# Patient Record
Sex: FEMALE | Race: WHITE | NOT HISPANIC OR LATINO | Employment: PART TIME | URBAN - METROPOLITAN AREA
[De-identification: names, ages, dates, MRNs, and addresses within clinical notes are randomized per-mention and may not be internally consistent; named-entity substitution may affect disease eponyms.]

---

## 2017-09-28 ENCOUNTER — OFFICE VISIT (OUTPATIENT)
Dept: NEUROLOGY | Facility: CLINIC | Age: 37
End: 2017-09-28

## 2017-09-28 VITALS
OXYGEN SATURATION: 98 % | DIASTOLIC BLOOD PRESSURE: 83 MMHG | BODY MASS INDEX: 35.88 KG/M2 | WEIGHT: 195 LBS | SYSTOLIC BLOOD PRESSURE: 128 MMHG | HEART RATE: 80 BPM | HEIGHT: 62 IN

## 2017-09-28 DIAGNOSIS — G40.909 SEIZURE DISORDER (HCC): Primary | ICD-10-CM

## 2017-09-28 DIAGNOSIS — G47.33 OBSTRUCTIVE SLEEP APNEA: ICD-10-CM

## 2017-09-28 PROCEDURE — 99213 OFFICE O/P EST LOW 20 MIN: CPT | Performed by: PSYCHIATRY & NEUROLOGY

## 2017-09-28 RX ORDER — LEVETIRACETAM 500 MG/1
500 TABLET, EXTENDED RELEASE ORAL DAILY
Qty: 30 TABLET | Refills: 11 | Status: SHIPPED | OUTPATIENT
Start: 2017-09-28 | End: 2018-08-03 | Stop reason: SDUPTHER

## 2017-09-28 RX ORDER — FOLIC ACID 1 MG/1
2000 TABLET ORAL DAILY
Qty: 60 TABLET | Refills: 11 | Status: SHIPPED | OUTPATIENT
Start: 2017-09-28 | End: 2018-08-03 | Stop reason: SDUPTHER

## 2018-08-03 DIAGNOSIS — G40.909 SEIZURE DISORDER (HCC): ICD-10-CM

## 2018-08-06 RX ORDER — LEVETIRACETAM 500 MG/1
500 TABLET, EXTENDED RELEASE ORAL DAILY
Qty: 30 TABLET | Refills: 2 | Status: SHIPPED | OUTPATIENT
Start: 2018-08-06 | End: 2018-09-20 | Stop reason: SDUPTHER

## 2018-08-06 RX ORDER — FOLIC ACID 1 MG/1
2000 TABLET ORAL DAILY
Qty: 60 TABLET | Refills: 2 | Status: SHIPPED | OUTPATIENT
Start: 2018-08-06 | End: 2018-09-20 | Stop reason: SDUPTHER

## 2018-09-20 ENCOUNTER — OFFICE VISIT (OUTPATIENT)
Dept: NEUROLOGY | Facility: CLINIC | Age: 38
End: 2018-09-20

## 2018-09-20 VITALS
HEIGHT: 62 IN | HEART RATE: 84 BPM | DIASTOLIC BLOOD PRESSURE: 86 MMHG | SYSTOLIC BLOOD PRESSURE: 118 MMHG | WEIGHT: 195 LBS | OXYGEN SATURATION: 99 % | BODY MASS INDEX: 35.88 KG/M2

## 2018-09-20 DIAGNOSIS — G40.909 SEIZURE DISORDER (HCC): ICD-10-CM

## 2018-09-20 PROCEDURE — 99212 OFFICE O/P EST SF 10 MIN: CPT | Performed by: PSYCHIATRY & NEUROLOGY

## 2018-09-20 RX ORDER — FOLIC ACID 1 MG/1
2000 TABLET ORAL DAILY
Qty: 60 TABLET | Refills: 12 | Status: SHIPPED | OUTPATIENT
Start: 2018-09-20 | End: 2019-09-20

## 2018-09-20 RX ORDER — LEVETIRACETAM 500 MG/1
500 TABLET, EXTENDED RELEASE ORAL DAILY
Qty: 30 TABLET | Refills: 12 | Status: SHIPPED | OUTPATIENT
Start: 2018-09-20 | End: 2019-10-01 | Stop reason: SDUPTHER

## 2018-09-20 NOTE — PROGRESS NOTES
Subjective:     Patient ID: Val Blomo is a 38 y.o. female.    CC:   Chief Complaint   Patient presents with   • Seizures       HPI:   History of Present Illness  The following portions of the patient's history were reviewed and updated as appropriate: allergies, current medications, past family history, past medical history, past social history, past surgical history and problem list.     Denies any problems, new concerns, no seizures, significant headaches, does not see a primary. She has had a total of two seizures. EEG in [address]16 was normal, MRI showed several small bifrontal gliotic foci, felt to be migraine related.    History reviewed. No pertinent past medical history.    History reviewed. No pertinent surgical history.    Social History     Social History   • Marital status:      Spouse name: N/A   • Number of children: N/A   • Years of education: N/A     Occupational History   • Not on file.     Social History Main Topics   • Smoking status: Never Smoker   • Smokeless tobacco: Not on file   • Alcohol use Yes      Comment: Occasional   • Drug use: Unknown   • Sexual activity: Not on file     Other Topics Concern   • Not on file     Social History Narrative   • No narrative on file       Family History   Problem Relation Age of Onset   • Alcohol abuse Mother    • Alzheimer's disease Mother    • Stroke Mother    • Seizures Mother         Epilepsy   • Hypertension Mother    • Cancer Mother         History of malignant neoplasm        Review of Systems   Constitutional: Negative for chills, fatigue, fever and unexpected weight change.   HENT: Negative for ear pain, hearing loss, nosebleeds, rhinorrhea and sore throat.    Eyes: Negative for photophobia, pain, discharge, itching and visual disturbance.   Respiratory: Negative for cough, chest tightness, shortness of breath and wheezing.    Cardiovascular: Negative for chest pain, palpitations and leg swelling.   Gastrointestinal: Negative for abdominal  pain, blood in stool, constipation, diarrhea, nausea and vomiting.   Genitourinary: Negative for dysuria, frequency, hematuria and urgency.   Musculoskeletal: Negative for arthralgias, back pain, gait problem, joint swelling, myalgias, neck pain and neck stiffness.   Skin: Negative for rash and wound.   Allergic/Immunologic: Negative for environmental allergies and food allergies.   Neurological: Negative for dizziness, tremors, seizures, syncope, speech difficulty, weakness, light-headedness, numbness and headaches.   Hematological: Negative for adenopathy. Does not bruise/bleed easily.   Psychiatric/Behavioral: Negative for agitation, confusion, decreased concentration, hallucinations, sleep disturbance and suicidal ideas. The patient is not nervous/anxious.         Objective:    Neurologic Exam     Mental Status   Oriented to person, place, and time.       Physical Exam   Constitutional: She is oriented to person, place, and time. She appears well-developed and well-nourished.   Cardiovascular: Normal rate and regular rhythm.    Pulmonary/Chest: Effort normal.   Neurological: She is alert and oriented to person, place, and time. She has normal reflexes.   Psychiatric: She has a normal mood and affect. Her behavior is normal. Thought content normal.       Assessment/Plan:       Val was seen today for seizures.    Diagnoses and all orders for this visit:    Seizure disorder (CMS/[facility])  -     levETIRAcetam XR (KEPPRA XR) 500 MG 24 hr tablet; Take 1 tablet by mouth Daily.  -     folic acid (FOLVITE) 1 MG tablet; Take 2 tablets by mouth Daily.    Recommended to see a primary or women's health for health maintenance, call for problems..         Adrián Pringle MD  9/20/2018

## 2019-09-23 DIAGNOSIS — G40.909 SEIZURE DISORDER (HCC): ICD-10-CM

## 2019-09-24 RX ORDER — FOLIC ACID 1 MG/1
2000 TABLET ORAL DAILY
Qty: 60 TABLET | Refills: 0 | OUTPATIENT
Start: 2019-09-24 | End: 2020-09-23

## 2019-10-01 ENCOUNTER — APPOINTMENT (OUTPATIENT)
Dept: LAB | Facility: HOSPITAL | Age: 39
End: 2019-10-01

## 2019-10-01 ENCOUNTER — OFFICE VISIT (OUTPATIENT)
Dept: NEUROLOGY | Facility: CLINIC | Age: 39
End: 2019-10-01

## 2019-10-01 VITALS
WEIGHT: 202 LBS | DIASTOLIC BLOOD PRESSURE: 80 MMHG | HEART RATE: 92 BPM | BODY MASS INDEX: 37.17 KG/M2 | HEIGHT: 62 IN | SYSTOLIC BLOOD PRESSURE: 130 MMHG | OXYGEN SATURATION: 98 %

## 2019-10-01 DIAGNOSIS — Z76.89 ENCOUNTER TO ESTABLISH CARE: ICD-10-CM

## 2019-10-01 DIAGNOSIS — Z51.81 ENCOUNTER FOR THERAPEUTIC DRUG LEVEL MONITORING: ICD-10-CM

## 2019-10-01 DIAGNOSIS — G40.909 SEIZURE DISORDER (HCC): Primary | ICD-10-CM

## 2019-10-01 LAB
BASOPHILS # BLD AUTO: 0.06 10*3/MM3 (ref 0–0.2)
BASOPHILS NFR BLD AUTO: 0.8 % (ref 0–1.5)
DEPRECATED RDW RBC AUTO: 43 FL (ref 37–54)
EOSINOPHIL # BLD AUTO: 0.08 10*3/MM3 (ref 0–0.4)
EOSINOPHIL NFR BLD AUTO: 1.1 % (ref 0.3–6.2)
ERYTHROCYTE [DISTWIDTH] IN BLOOD BY AUTOMATED COUNT: 12.6 % (ref 12.3–15.4)
HCT VFR BLD AUTO: 41.8 % (ref 34–46.6)
HGB BLD-MCNC: 14.1 G/DL (ref 12–15.9)
IMM GRANULOCYTES # BLD AUTO: 0.02 10*3/MM3 (ref 0–0.05)
IMM GRANULOCYTES NFR BLD AUTO: 0.3 % (ref 0–0.5)
LYMPHOCYTES # BLD AUTO: 3.11 10*3/MM3 (ref 0.7–3.1)
LYMPHOCYTES NFR BLD AUTO: 41.1 % (ref 19.6–45.3)
MCH RBC QN AUTO: 31.5 PG (ref 26.6–33)
MCHC RBC AUTO-ENTMCNC: 33.7 G/DL (ref 31.5–35.7)
MCV RBC AUTO: 93.3 FL (ref 79–97)
MONOCYTES # BLD AUTO: 0.57 10*3/MM3 (ref 0.1–0.9)
MONOCYTES NFR BLD AUTO: 7.5 % (ref 5–12)
NEUTROPHILS # BLD AUTO: 3.73 10*3/MM3 (ref 1.7–7)
NEUTROPHILS NFR BLD AUTO: 49.2 % (ref 42.7–76)
NRBC BLD AUTO-RTO: 0 /100 WBC (ref 0–0.2)
PLATELET # BLD AUTO: 194 10*3/MM3 (ref 140–450)
PMV BLD AUTO: 9.9 FL (ref 6–12)
RBC # BLD AUTO: 4.48 10*6/MM3 (ref 3.77–5.28)
WBC NRBC COR # BLD: 7.57 10*3/MM3 (ref 3.4–10.8)

## 2019-10-01 PROCEDURE — 99213 OFFICE O/P EST LOW 20 MIN: CPT | Performed by: NURSE PRACTITIONER

## 2019-10-01 PROCEDURE — 36415 COLL VENOUS BLD VENIPUNCTURE: CPT | Performed by: NURSE PRACTITIONER

## 2019-10-01 PROCEDURE — 80177 DRUG SCRN QUAN LEVETIRACETAM: CPT | Performed by: NURSE PRACTITIONER

## 2019-10-01 PROCEDURE — 80053 COMPREHEN METABOLIC PANEL: CPT | Performed by: NURSE PRACTITIONER

## 2019-10-01 PROCEDURE — 85025 COMPLETE CBC W/AUTO DIFF WBC: CPT | Performed by: NURSE PRACTITIONER

## 2019-10-01 RX ORDER — FOLIC ACID 1 MG/1
2 TABLET ORAL DAILY
Qty: 60 TABLET | Refills: 11 | Status: SHIPPED | OUTPATIENT
Start: 2019-10-01 | End: 2020-10-01 | Stop reason: SDUPTHER

## 2019-10-01 RX ORDER — LEVETIRACETAM 500 MG/1
500 TABLET, EXTENDED RELEASE ORAL DAILY
Qty: 30 TABLET | Refills: 11 | Status: SHIPPED | OUTPATIENT
Start: 2019-10-01 | End: 2020-10-01 | Stop reason: SDUPTHER

## 2019-10-01 NOTE — PROGRESS NOTES
Subjective:     Patient ID: Val Bloom is a 39 y.o. female.    CC:   Chief Complaint   Patient presents with   • Seizures       HPI:   History of Present Illness   This is a 39-year-old female who presents for one-year follow-up on 2 seizure episodes once in first grade and then the last time in 2015.  She is currently well controlled on levetiracetam  mg daily.  She is also taking folic acid 2 mg daily.  She is planning to eventually have children.  She is not on birth control.  She does not have any children at this time.  She is  and is sexually active at times.  She does not have a primary care provider and would like to establish care with one.  She had an EEG in 2016 which was normal.  She had an MRI in 2016 which showed several small bifrontal gliotic foci felt to be related to migraine.  She tells me that she does have 1-2 headaches a month that occur over her head, she has some nausea, a little bit of light sensitivity, mild to moderate pain, lasting only 2 to 2-1/2 hours and she will take an Aleve or Excedrin Migraine and this helps.  Her mother has epilepsy.  For one seizure she had when she was a child was a generalized convulsive seizure.  The second time she had a seizure was witnessed by her  and was in the night when she would not respond to him and was staring and unable to speak.  She is tolerating medication well.    The following portions of the patient's history were reviewed and updated as appropriate: allergies, current medications, past family history, past medical history, past social history, past surgical history and problem list.    Past Medical History:   Diagnosis Date   • Aspiration pneumonia (CMS/HCC) 9/15/2016   • Migraine    • Seizures (CMS/HCC)     first grade initial and then 2nd in 2015       Past Surgical History:   Procedure Laterality Date   • NO PAST SURGERIES         Social History     Socioeconomic History   • Marital status:      Spouse  name: Not on file   • Number of children: Not on file   • Years of education: Not on file   • Highest education level: Not on file   Tobacco Use   • Smoking status: Never Smoker   • Smokeless tobacco: Never Used   Substance and Sexual Activity   • Alcohol use: Yes     Comment: Occasional   • Drug use: No   • Sexual activity: Yes       Family History   Problem Relation Age of Onset   • Alcohol abuse Mother    • Alzheimer's disease Mother    • Stroke Mother    • Seizures Mother         Epilepsy   • Hypertension Mother    • Cancer Mother         History of malignant neoplasm        Review of Systems   Constitutional: Negative for chills, fatigue, fever and unexpected weight change.   HENT: Negative for ear pain, hearing loss, nosebleeds, rhinorrhea and sore throat.    Eyes: Negative for photophobia, pain, discharge, itching and visual disturbance.   Respiratory: Negative for cough, chest tightness, shortness of breath and wheezing.    Cardiovascular: Negative for chest pain, palpitations and leg swelling.   Gastrointestinal: Negative for abdominal pain, blood in stool, constipation, diarrhea, nausea and vomiting.   Genitourinary: Negative for dysuria, frequency, hematuria and urgency.   Musculoskeletal: Negative for arthralgias, back pain, gait problem, joint swelling, myalgias, neck pain and neck stiffness.   Skin: Negative for rash and wound.   Allergic/Immunologic: Negative for environmental allergies and food allergies.   Neurological: Positive for seizures. Negative for dizziness, tremors, syncope, speech difficulty, weakness, light-headedness, numbness and headaches.   Hematological: Negative for adenopathy. Does not bruise/bleed easily.   Psychiatric/Behavioral: Negative for agitation, confusion, decreased concentration, hallucinations, sleep disturbance and suicidal ideas. The patient is not nervous/anxious.    All other systems reviewed and are negative.       Objective:    Neurologic Exam     Mental Status    Oriented to person, place, and time.   Level of consciousness: alert    Cranial Nerves   Cranial nerves II through XII intact.     Motor Exam   Muscle bulk: normal  Overall muscle tone: normal    Strength   Strength 5/5 throughout.     Gait, Coordination, and Reflexes     Gait  Gait: normal    Coordination   Finger to nose coordination: normal    Tremor   Resting tremor: absent  Intention tremor: absent  Action tremor: absent    Reflexes   Right brachioradialis: 2+  Left brachioradialis: 2+  Right biceps: 2+  Left biceps: 2+  Right triceps: 2+  Left triceps: 2+  Right patellar: 2+  Left patellar: 2+  Right achilles: 2+  Left achilles: 2+  Right : 2+  Left : 2+      Physical Exam   Constitutional: She is oriented to person, place, and time.   Neurological: She is oriented to person, place, and time. She has normal strength. She has a normal Finger-Nose-Finger Test. Gait normal.   Reflex Scores:       Tricep reflexes are 2+ on the right side and 2+ on the left side.       Bicep reflexes are 2+ on the right side and 2+ on the left side.       Brachioradialis reflexes are 2+ on the right side and 2+ on the left side.       Patellar reflexes are 2+ on the right side and 2+ on the left side.       Achilles reflexes are 2+ on the right side and 2+ on the left side.      Assessment/Plan:       Val was seen today for seizures.    Diagnoses and all orders for this visit:    Seizure disorder (CMS/[facility])  -     folic acid (FOLVITE) 1 MG tablet; Take 2 tablets by mouth Daily.  -     levETIRAcetam XR (KEPPRA XR) 500 MG 24 hr tablet; Take 1 tablet by mouth Daily.  -     Comprehensive Metabolic Panel; Future  -     CBC & Differential; Future  -     Levetiracetam Level (Keppra); Future  -     Comprehensive Metabolic Panel  -     CBC & Differential  -     Levetiracetam Level (Keppra)  -     CBC Auto Differential    Encounter for therapeutic drug level monitoring  -     Comprehensive Metabolic Panel; Future  -     CBC &  Differential; Future  -     Levetiracetam Level (Keppra); Future  -     Cancel: Ambulatory Referral to Family Practice  -     Comprehensive Metabolic Panel  -     CBC & Differential  -     Levetiracetam Level (Keppra)  -     CBC Auto Differential    Encounter to establish care  -     Ambulatory Referral to Family Practice         There is a note about sleep apnea history but she tells me that was actually her father.  She tells me that nobody complains that she snores.  She feels like she sleeps well some nights and not well other nights.  She does not want to have a sleep study at this time.  She may consider in the future. Reviewed medications, potential side effects and signs and symptoms to report. Discussed risk versus benefits of treatment plan with patient and/or family-including medications, labs and radiology that may be ordered. Addressed questions and concerns during visit. Patient and/or family verbalized understanding and agree with plan. F/U 1 year with Dr. Pringle. If she becomes pregnant she is to notify us, register for the national AED pregnancy registry and establish care with OB.    Patient instructions include: No driving or operating heavy machinery, solo bathing/tub baths for 3 months from onset of most recent seizure. Minimize stress as much as possible. Recommended 7-8 hours of sleep each night. Abstain from alcohol intake. Educated on Antiepileptic medications with possible side effects and signs and symptoms to report if prescribed during visit. Instructed to take seizure medication daily if prescribed. Reviewed potential seizure risk factors. Instructed to call 911 or our office if another seizure does occur.    During this visit the following were done:  Labs Reviewed []    Labs Ordered [x]    Radiology Reports Reviewed [x]    Radiology Ordered []    PCP Records Reviewed []    Referring Provider Records Reviewed []    ER Records Reviewed []    Hospital Records Reviewed []    History  Obtained From Family []    Radiology Images Reviewed []    Other Reviewed []    Records Requested []      EMR Dragon/Transcription Disclaimer:  Much of this encounter note is an electronic transcription of spoken language to printed text. Electronic transcription of spoken language may permit erroneous words or phrases to be inadvertently transcribed. Although I have reviewed the note for such errors, some may still exist in this documentation.      Yara Jensen, APRN  10/1/2019

## 2019-10-02 LAB
ALBUMIN SERPL-MCNC: 4.6 G/DL (ref 3.5–5.2)
ALBUMIN/GLOB SERPL: 1.7 G/DL
ALP SERPL-CCNC: 61 U/L (ref 39–117)
ALT SERPL W P-5'-P-CCNC: 28 U/L (ref 1–33)
ANION GAP SERPL CALCULATED.3IONS-SCNC: 13 MMOL/L (ref 5–15)
AST SERPL-CCNC: 23 U/L (ref 1–32)
BILIRUB SERPL-MCNC: 0.3 MG/DL (ref 0.2–1.2)
BUN BLD-MCNC: 15 MG/DL (ref 6–20)
BUN/CREAT SERPL: 19.2 (ref 7–25)
CALCIUM SPEC-SCNC: 9.2 MG/DL (ref 8.6–10.5)
CHLORIDE SERPL-SCNC: 103 MMOL/L (ref 98–107)
CO2 SERPL-SCNC: 23 MMOL/L (ref 22–29)
CREAT BLD-MCNC: 0.78 MG/DL (ref 0.57–1)
GFR SERPL CREATININE-BSD FRML MDRD: 83 ML/MIN/1.73
GLOBULIN UR ELPH-MCNC: 2.7 GM/DL
GLUCOSE BLD-MCNC: 87 MG/DL (ref 65–99)
POTASSIUM BLD-SCNC: 4.6 MMOL/L (ref 3.5–5.2)
PROT SERPL-MCNC: 7.3 G/DL (ref 6–8.5)
SODIUM BLD-SCNC: 139 MMOL/L (ref 136–145)

## 2019-10-02 NOTE — PROGRESS NOTES
Blood work looks normal so far. Mail patient a copy. Hopefully she will have a PCP soon-I placed [facility] pcp referral yesterday. Thanks, JIMENEZ Hernandez

## 2019-10-03 ENCOUNTER — TELEPHONE (OUTPATIENT)
Dept: NEUROLOGY | Facility: CLINIC | Age: 39
End: 2019-10-03

## 2019-10-03 NOTE — TELEPHONE ENCOUNTER
----- Message from JIMENEZ Morales sent at 10/2/2019 10:34 AM EDT -----  Blood work looks normal so far. Mail patient a copy. Hopefully she will have a PCP soon-I placed [facility] pcp referral yesterday. Thanks, JIMENEZ Hernandez

## 2019-10-04 ENCOUNTER — TELEPHONE (OUTPATIENT)
Dept: NEUROLOGY | Facility: CLINIC | Age: 39
End: 2019-10-04

## 2019-10-04 LAB — LEVETIRACETAM SERPL-MCNC: 6.4 UG/ML (ref 10–40)

## 2019-10-04 NOTE — TELEPHONE ENCOUNTER
----- Message from JIMENEZ Morales sent at 10/2/2019 10:34 AM EDT -----  Blood work looks normal so far. Mail patient a copy. Hopefully she will have a PCP soon-I placed [facility] pcp referral yesterday. Thanks, JIMENEZ Hernandez

## 2019-10-07 ENCOUNTER — TELEPHONE (OUTPATIENT)
Dept: NEUROLOGY | Facility: CLINIC | Age: 39
End: 2019-10-07

## 2019-10-07 NOTE — TELEPHONE ENCOUNTER
----- Message from JIMENEZ Morales sent at 10/2/2019 10:34 AM EDT -----  Blood work looks normal so far. Mail patient a copy. Hopefully she will have a PCP soon-I placed [facility] p[facility] referral yesterday. Thanks, JIMENEZ Hernandez

## 2019-10-14 ENCOUNTER — OFFICE VISIT (OUTPATIENT)
Dept: INTERNAL MEDICINE | Facility: CLINIC | Age: 39
End: 2019-10-14

## 2019-10-14 VITALS
HEIGHT: 62 IN | DIASTOLIC BLOOD PRESSURE: 80 MMHG | SYSTOLIC BLOOD PRESSURE: 130 MMHG | TEMPERATURE: 97.2 F | BODY MASS INDEX: 36.99 KG/M2 | OXYGEN SATURATION: 99 % | WEIGHT: 201 LBS | RESPIRATION RATE: 20 BRPM | HEART RATE: 102 BPM

## 2019-10-14 DIAGNOSIS — M54.42 CHRONIC LEFT-SIDED LOW BACK PAIN WITH LEFT-SIDED SCIATICA: Primary | ICD-10-CM

## 2019-10-14 DIAGNOSIS — Z13.21 ENCOUNTER FOR VITAMIN DEFICIENCY SCREENING: ICD-10-CM

## 2019-10-14 DIAGNOSIS — Z13.0 SCREENING FOR BLOOD DISEASE: ICD-10-CM

## 2019-10-14 DIAGNOSIS — R56.9 SEIZURES (HCC): ICD-10-CM

## 2019-10-14 DIAGNOSIS — G89.29 CHRONIC LEFT-SIDED LOW BACK PAIN WITH LEFT-SIDED SCIATICA: Primary | ICD-10-CM

## 2019-10-14 DIAGNOSIS — Z13.29 THYROID DISORDER SCREENING: ICD-10-CM

## 2019-10-14 DIAGNOSIS — Z13.220 LIPID SCREENING: ICD-10-CM

## 2019-10-14 PROCEDURE — 99204 OFFICE O/P NEW MOD 45 MIN: CPT | Performed by: NURSE PRACTITIONER

## 2019-10-14 NOTE — PROGRESS NOTES
Subjective   Chief Complaint   Patient presents with   • Seizures     establish care       Val Bloom is a 39 y.o. female here today to establish care and for back pain and seizures.  She had a seizure about 4 years ago and was started on Keppra.  She has been doing well with this and has not had any breakthrough seizures.  She has some periodic migraines but feels these are manageable.  She is still following up with neurology about twice yearly for Keppra refills.  Neurology suggested that she get a PCP due to needing fasting labs and follow-up on back pain.  She experiences low back pain with left-sided sciatica that radiates into her left leg.  Pain comes and goes but is irritated by increased physical activity.  She is able to take some anti-inflammatories and calm this down.  She does not want to get an x-ray at this time.  She denies any shortness of air or chest pain.    Review of Systems   Constitutional: Positive for fatigue. Negative for activity change, appetite change, chills, diaphoresis, fever, unexpected weight gain and unexpected weight loss.   HENT: Negative for ear discharge, ear pain, mouth sores, nosebleeds, sinus pressure, sneezing and sore throat.    Eyes: Negative for pain, discharge and itching.   Respiratory: Negative for cough, chest tightness, shortness of breath and wheezing.    Cardiovascular: Negative for chest pain, palpitations and leg swelling.   Gastrointestinal: Negative for abdominal pain, constipation, diarrhea, nausea and vomiting.   Endocrine: Negative for heat intolerance, polydipsia and polyphagia.   Genitourinary: Negative for dysuria, flank pain, frequency, hematuria and urgency.   Musculoskeletal: Positive for back pain and myalgias. Negative for gait problem, joint swelling, neck pain and neck stiffness.   Skin: Negative for color change, pallor and rash.   Allergic/Immunologic: Negative for immunocompromised state.   Neurological: Positive for seizures. Negative  "for speech difficulty, weakness and numbness.   Hematological: Negative for adenopathy.   Psychiatric/Behavioral: Negative for agitation, decreased concentration, sleep disturbance and depressed mood. The patient is not nervous/anxious.        Past Medical History:   Diagnosis Date   • Aspiration pneumonia (CMS/HCC) 9/15/2016   • Migraine    • Seizures (CMS/HCC)     first grade initial and then 2nd in 2015     Past Surgical History:   Procedure Laterality Date   • NO PAST SURGERIES       Family History   Problem Relation Age of Onset   • Alcohol abuse Mother    • Alzheimer's disease Mother    • Stroke Mother    • Seizures Mother         Epilepsy   • Hypertension Mother    • Cancer Mother         History of malignant neoplasm   • Migraines Mother      Social History     Tobacco Use   Smoking Status Never Smoker   Smokeless Tobacco Never Used      Social History     Substance and Sexual Activity   Alcohol Use Yes    Comment: Occasional      Current Outpatient Medications on File Prior to Visit   Medication Sig   • folic acid (FOLVITE) 1 MG tablet Take 2 tablets by mouth Daily.   • levETIRAcetam XR (KEPPRA XR) 500 MG 24 hr tablet Take 1 tablet by mouth Daily.     No current facility-administered medications on file prior to visit.      No Known Allergies    Objective   Vitals:    10/14/19 1502   BP: 130/80   BP Location: Left arm   Patient Position: Sitting   Cuff Size: Adult   Pulse: 102   Resp: 20   Temp: 97.2 °F (36.2 °C)   TempSrc: Temporal   SpO2: 99%   Weight: 91.2 kg (201 lb)   Height: 157.5 cm (62\")   PainSc: 0-No pain     Body mass index is 36.76 kg/m².    Physical Exam   Constitutional: She is oriented to person, place, and time. She appears well-developed and well-nourished.   HENT:   Head: Normocephalic and atraumatic.   Nose: Nose normal.   Eyes: EOM are normal. Pupils are equal, round, and reactive to light.   Neck: Trachea normal and normal range of motion. No thyromegaly present.   Cardiovascular: " Normal rate, regular rhythm and normal heart sounds.   Pulmonary/Chest: Effort normal and breath sounds normal.   Abdominal: Soft. Bowel sounds are normal. There is no tenderness.   Musculoskeletal: Normal range of motion.        Lumbar back: She exhibits tenderness.   Neurological: She is alert and oriented to person, place, and time. She has normal strength. GCS eye subscore is 4. GCS verbal subscore is 5. GCS motor subscore is 6.   Skin: Skin is warm and dry.   Psychiatric: She has a normal mood and affect. Her speech is normal and behavior is normal. Thought content normal. Cognition and memory are normal.   Vitals reviewed.      Assessment/Plan   Val was seen today for seizures.    Diagnoses and all orders for this visit:    Chronic left-sided low back pain with left-sided sciatica  Chronic issue unstable requiring medication management and monitoring. Will eat well balanced diet, increase water intake, increase physical activity as tolerated, and get adequate rest. Can use heating pad for discomfort in this area. Discussed stretching exercises and sleeping positions to help with pain.   Continue OTC anti-inflammatories PRN. Suggested xray and PT.   -     CBC Auto Differential; Future  -     Comprehensive Metabolic Panel; Future  -     Vitamin B12 & Folate; Future    Seizures (CMS/HCC)  Chronic issue stable requiring continued medication management. Will continue heart healthy diet, increase water intake, increase physical activity as tolerated, and get adequate rest.   Continue Keppra and neurology follow ups.  -     CBC Auto Differential; Future  -     Comprehensive Metabolic Panel; Future  -     Vitamin B12 & Folate; Future  -     TSH Rfx On Abnormal To Free T4; Future    Screening for blood disease  -     CBC Auto Differential; Future  -     Comprehensive Metabolic Panel; Future  -     Lipid Panel; Future  -     Vitamin B12 & Folate; Future  -     Vitamin D 25 Hydroxy; Future  -     TSH Rfx On Abnormal To  Free T4; Future    Encounter for vitamin deficiency screening  -     Vitamin B12 & Folate; Future  -     Vitamin D 25 Hydroxy; Future    Lipid screening  -     Lipid Panel; Future    Thyroid disorder screening  -     TSH Rfx On Abnormal To Free T4; Future           Current Outpatient Medications:   •  folic acid (FOLVITE) 1 MG tablet, Take 2 tablets by mouth Daily., Disp: 60 tablet, Rfl: 11  •  levETIRAcetam XR (KEPPRA XR) 500 MG 24 hr tablet, Take 1 tablet by mouth Daily., Disp: 30 tablet, Rfl: 11       Plan of care reviewed with the patient at the conclusion of today's visit.  Education was provided regarding diagnosis, management, and any prescribed or recommended OTC medications.  Patient verbalized understanding of and agreement with management plan.     Return if symptoms worsen or fail to improve.      Sarah Lema, APRN

## 2019-10-17 PROBLEM — G89.29 CHRONIC LEFT-SIDED LOW BACK PAIN WITH LEFT-SIDED SCIATICA: Status: ACTIVE | Noted: 2019-10-17

## 2019-10-17 PROBLEM — M54.42 CHRONIC LEFT-SIDED LOW BACK PAIN WITH LEFT-SIDED SCIATICA: Status: ACTIVE | Noted: 2019-10-17

## 2019-10-22 ENCOUNTER — LAB (OUTPATIENT)
Dept: INTERNAL MEDICINE | Facility: CLINIC | Age: 39
End: 2019-10-22

## 2019-10-22 DIAGNOSIS — Z13.220 LIPID SCREENING: ICD-10-CM

## 2019-10-22 DIAGNOSIS — R56.9 SEIZURES (HCC): ICD-10-CM

## 2019-10-22 DIAGNOSIS — G89.29 CHRONIC LEFT-SIDED LOW BACK PAIN WITH LEFT-SIDED SCIATICA: ICD-10-CM

## 2019-10-22 DIAGNOSIS — M54.42 CHRONIC LEFT-SIDED LOW BACK PAIN WITH LEFT-SIDED SCIATICA: ICD-10-CM

## 2019-10-22 DIAGNOSIS — Z13.21 ENCOUNTER FOR VITAMIN DEFICIENCY SCREENING: ICD-10-CM

## 2019-10-22 DIAGNOSIS — Z13.0 SCREENING FOR BLOOD DISEASE: ICD-10-CM

## 2019-10-22 DIAGNOSIS — Z13.29 THYROID DISORDER SCREENING: ICD-10-CM

## 2019-10-22 LAB
25(OH)D3 SERPL-MCNC: 19.5 NG/ML (ref 30–100)
ALBUMIN SERPL-MCNC: 4.3 G/DL (ref 3.5–5.2)
ALBUMIN/GLOB SERPL: 1.5 G/DL
ALP SERPL-CCNC: 63 U/L (ref 39–117)
ALT SERPL W P-5'-P-CCNC: 24 U/L (ref 1–33)
ANION GAP SERPL CALCULATED.3IONS-SCNC: 12.6 MMOL/L (ref 5–15)
AST SERPL-CCNC: 22 U/L (ref 1–32)
BASOPHILS # BLD AUTO: 0.05 10*3/MM3 (ref 0–0.2)
BASOPHILS NFR BLD AUTO: 0.7 % (ref 0–1.5)
BILIRUB SERPL-MCNC: 0.4 MG/DL (ref 0.2–1.2)
BUN BLD-MCNC: 14 MG/DL (ref 6–20)
BUN/CREAT SERPL: 19.2 (ref 7–25)
CALCIUM SPEC-SCNC: 8.7 MG/DL (ref 8.6–10.5)
CHLORIDE SERPL-SCNC: 103 MMOL/L (ref 98–107)
CHOLEST SERPL-MCNC: 173 MG/DL (ref 0–200)
CO2 SERPL-SCNC: 24.4 MMOL/L (ref 22–29)
CREAT BLD-MCNC: 0.73 MG/DL (ref 0.57–1)
DEPRECATED RDW RBC AUTO: 41.6 FL (ref 37–54)
EOSINOPHIL # BLD AUTO: 0.09 10*3/MM3 (ref 0–0.4)
EOSINOPHIL NFR BLD AUTO: 1.2 % (ref 0.3–6.2)
ERYTHROCYTE [DISTWIDTH] IN BLOOD BY AUTOMATED COUNT: 12.3 % (ref 12.3–15.4)
FOLATE SERPL-MCNC: >20 NG/ML (ref 4.78–24.2)
GFR SERPL CREATININE-BSD FRML MDRD: 90 ML/MIN/1.73
GLOBULIN UR ELPH-MCNC: 2.9 GM/DL
GLUCOSE BLD-MCNC: 91 MG/DL (ref 65–99)
HCT VFR BLD AUTO: 41.6 % (ref 34–46.6)
HDLC SERPL-MCNC: 36 MG/DL (ref 40–60)
HGB BLD-MCNC: 14.1 G/DL (ref 12–15.9)
IMM GRANULOCYTES # BLD AUTO: 0.02 10*3/MM3 (ref 0–0.05)
IMM GRANULOCYTES NFR BLD AUTO: 0.3 % (ref 0–0.5)
LDLC SERPL CALC-MCNC: 109 MG/DL (ref 0–100)
LDLC/HDLC SERPL: 3.03 {RATIO}
LYMPHOCYTES # BLD AUTO: 2.11 10*3/MM3 (ref 0.7–3.1)
LYMPHOCYTES NFR BLD AUTO: 27.4 % (ref 19.6–45.3)
MCH RBC QN AUTO: 31.4 PG (ref 26.6–33)
MCHC RBC AUTO-ENTMCNC: 33.9 G/DL (ref 31.5–35.7)
MCV RBC AUTO: 92.7 FL (ref 79–97)
MONOCYTES # BLD AUTO: 0.58 10*3/MM3 (ref 0.1–0.9)
MONOCYTES NFR BLD AUTO: 7.5 % (ref 5–12)
NEUTROPHILS # BLD AUTO: 4.84 10*3/MM3 (ref 1.7–7)
NEUTROPHILS NFR BLD AUTO: 62.9 % (ref 42.7–76)
NRBC BLD AUTO-RTO: 0 /100 WBC (ref 0–0.2)
PLATELET # BLD AUTO: 194 10*3/MM3 (ref 140–450)
PMV BLD AUTO: 9.8 FL (ref 6–12)
POTASSIUM BLD-SCNC: 4.5 MMOL/L (ref 3.5–5.2)
PROT SERPL-MCNC: 7.2 G/DL (ref 6–8.5)
RBC # BLD AUTO: 4.49 10*6/MM3 (ref 3.77–5.28)
SODIUM BLD-SCNC: 140 MMOL/L (ref 136–145)
TRIGL SERPL-MCNC: 139 MG/DL (ref 0–150)
TSH SERPL DL<=0.05 MIU/L-ACNC: 1.48 UIU/ML (ref 0.27–4.2)
VIT B12 BLD-MCNC: 540 PG/ML (ref 211–946)
VLDLC SERPL-MCNC: 27.8 MG/DL (ref 5–40)
WBC NRBC COR # BLD: 7.69 10*3/MM3 (ref 3.4–10.8)

## 2019-10-22 PROCEDURE — 85025 COMPLETE CBC W/AUTO DIFF WBC: CPT | Performed by: NURSE PRACTITIONER

## 2019-10-22 PROCEDURE — 82607 VITAMIN B-12: CPT | Performed by: NURSE PRACTITIONER

## 2019-10-22 PROCEDURE — 84443 ASSAY THYROID STIM HORMONE: CPT | Performed by: NURSE PRACTITIONER

## 2019-10-22 PROCEDURE — 82306 VITAMIN D 25 HYDROXY: CPT | Performed by: NURSE PRACTITIONER

## 2019-10-22 PROCEDURE — 80061 LIPID PANEL: CPT | Performed by: NURSE PRACTITIONER

## 2019-10-22 PROCEDURE — 80053 COMPREHEN METABOLIC PANEL: CPT | Performed by: NURSE PRACTITIONER

## 2019-10-22 PROCEDURE — 82746 ASSAY OF FOLIC ACID SERUM: CPT | Performed by: NURSE PRACTITIONER

## 2019-10-28 RX ORDER — ERGOCALCIFEROL 1.25 MG/1
50000 CAPSULE ORAL WEEKLY
Qty: 4 CAPSULE | Refills: 2 | Status: SHIPPED | OUTPATIENT
Start: 2019-10-28 | End: 2020-01-10

## 2019-10-28 NOTE — PROGRESS NOTES
Please contact patient and explain that test results indicate that your vitamin D level is low.  This can contribute to fatigue, depression, and problems with your bones. I have called in a prescription for Vitamin D to take once a weekly. All other labs are normal. If back pain continues have her follow up with me.

## 2019-10-29 ENCOUNTER — TELEPHONE (OUTPATIENT)
Dept: INTERNAL MEDICINE | Facility: CLINIC | Age: 39
End: 2019-10-29

## 2019-10-29 NOTE — TELEPHONE ENCOUNTER
PT LEFT  AT 12:21PM AND IS REQUESTING A CALL BACK TO DISCUSS HER LAB RESULTS. PLEASE ADVISE. CONFIRMED CALL BACK IS HOME:[phone]

## 2020-01-10 RX ORDER — ERGOCALCIFEROL 1.25 MG/1
CAPSULE ORAL
Qty: 4 CAPSULE | Refills: 2 | Status: SHIPPED | OUTPATIENT
Start: 2020-01-10 | End: 2020-04-29

## 2020-04-29 RX ORDER — ERGOCALCIFEROL 1.25 MG/1
CAPSULE ORAL
Qty: 4 CAPSULE | Refills: 2 | Status: SHIPPED | OUTPATIENT
Start: 2020-04-29 | End: 2020-07-20

## 2020-07-20 RX ORDER — ERGOCALCIFEROL 1.25 MG/1
CAPSULE ORAL
Qty: 4 CAPSULE | Refills: 2 | Status: SHIPPED | OUTPATIENT
Start: 2020-07-20 | End: 2020-10-02

## 2020-10-01 ENCOUNTER — OFFICE VISIT (OUTPATIENT)
Dept: NEUROLOGY | Facility: CLINIC | Age: 40
End: 2020-10-01

## 2020-10-01 VITALS
BODY MASS INDEX: 37.54 KG/M2 | SYSTOLIC BLOOD PRESSURE: 100 MMHG | DIASTOLIC BLOOD PRESSURE: 80 MMHG | HEIGHT: 62 IN | TEMPERATURE: 97.1 F | WEIGHT: 204 LBS | OXYGEN SATURATION: 98 % | HEART RATE: 100 BPM

## 2020-10-01 DIAGNOSIS — G40.909 SEIZURE DISORDER (HCC): ICD-10-CM

## 2020-10-01 PROCEDURE — 99212 OFFICE O/P EST SF 10 MIN: CPT | Performed by: PSYCHIATRY & NEUROLOGY

## 2020-10-01 RX ORDER — LEVETIRACETAM 500 MG/1
500 TABLET, EXTENDED RELEASE ORAL DAILY
Qty: 30 TABLET | Refills: 25 | Status: SHIPPED | OUTPATIENT
Start: 2020-10-01 | End: 2021-10-25

## 2020-10-01 RX ORDER — FOLIC ACID 1 MG/1
2 TABLET ORAL DAILY
Qty: 60 TABLET | Refills: 25 | Status: SHIPPED | OUTPATIENT
Start: 2020-10-01 | End: 2020-12-24 | Stop reason: SDUPTHER

## 2020-10-01 NOTE — PROGRESS NOTES
Subjective:     Patient ID: Val Bloom is a 40 y.o. female.    CC:   Chief Complaint   Patient presents with   • Seizures       HPI:   History of Present Illness  The following portions of the patient's history were reviewed and updated as appropriate: allergies, current medications, past family history, past medical history, past social history, past surgical history and problem list.     Denies any seizures, new concerns, tolerating Keppra.      Past Medical History:   Diagnosis Date   • Aspiration pneumonia (CMS/HCC) 9/15/2016   • Migraine    • Seizures (CMS/HCC)     first grade initial and then 2nd in 2015       Past Surgical History:   Procedure Laterality Date   • NO PAST SURGERIES         Social History     Socioeconomic History   • Marital status:      Spouse name: Not on file   • Number of children: Not on file   • Years of education: Not on file   • Highest education level: Not on file   Tobacco Use   • Smoking status: Never Smoker   • Smokeless tobacco: Never Used   Substance and Sexual Activity   • Alcohol use: Yes     Comment: Occasional   • Drug use: No   • Sexual activity: Yes       Family History   Problem Relation Age of Onset   • Alcohol abuse Mother    • Alzheimer's disease Mother    • Stroke Mother    • Seizures Mother         Epilepsy   • Hypertension Mother    • Cancer Mother         History of malignant neoplasm   • Migraines Mother         Review of Systems   Constitutional: Negative for chills, fatigue, fever and unexpected weight change.   HENT: Negative for ear pain, hearing loss, nosebleeds, rhinorrhea and sore throat.    Eyes: Negative for photophobia, pain, discharge, itching and visual disturbance.   Respiratory: Negative for cough, chest tightness, shortness of breath and wheezing.    Cardiovascular: Negative for chest pain, palpitations and leg swelling.   Gastrointestinal: Negative for abdominal pain, blood in stool, constipation, diarrhea, nausea and vomiting.  "  Genitourinary: Negative for dysuria, frequency, hematuria and urgency.   Musculoskeletal: Negative for arthralgias, back pain, gait problem, joint swelling, myalgias, neck pain and neck stiffness.   Skin: Negative for rash and wound.   Allergic/Immunologic: Negative for environmental allergies and food allergies.   Neurological: Negative for dizziness, tremors, seizures, syncope, speech difficulty, weakness, light-headedness, numbness and headaches.   Hematological: Negative for adenopathy. Does not bruise/bleed easily.   Psychiatric/Behavioral: Negative for agitation, confusion, decreased concentration, hallucinations, sleep disturbance and suicidal ideas. The patient is not nervous/anxious.         Objective:  /80   Pulse 100   Temp 97.1 °F (36.2 °C)   Ht 157.5 cm (62\")   Wt 92.5 kg (204 lb)   SpO2 98%   BMI 37.31 kg/m²     Neurologic Exam     Mental Status   Oriented to person, place, and time.       Physical Exam  Constitutional:       Appearance: She is well-developed.   Pulmonary:      Effort: Pulmonary effort is normal.   Neurological:      Mental Status: She is alert and oriented to person, place, and time.      Deep Tendon Reflexes: Reflexes are normal and symmetric.   Psychiatric:         Behavior: Behavior normal.         Thought Content: Thought content normal.         Assessment/Plan:       Diagnoses and all orders for this visit:    1. Seizure disorder (CMS/HCC)  -     folic acid (FOLVITE) 1 MG tablet; Take 2 tablets by mouth Daily.  Dispense: 60 tablet; Refill: 25  -     levETIRAcetam XR (KEPPRA XR) 500 MG 24 hr tablet; Take 1 tablet by mouth Daily.  Dispense: 30 tablet; Refill: 25               Adrián Pringle MD  10/13/2020        "

## 2020-10-02 RX ORDER — ERGOCALCIFEROL 1.25 MG/1
CAPSULE ORAL
Qty: 12 CAPSULE | Refills: 0 | Status: SHIPPED | OUTPATIENT
Start: 2020-10-02 | End: 2021-01-04 | Stop reason: SDUPTHER

## 2020-10-05 ENCOUNTER — TELEPHONE (OUTPATIENT)
Dept: NEUROLOGY | Facility: CLINIC | Age: 40
End: 2020-10-05

## 2020-10-05 NOTE — TELEPHONE ENCOUNTER
EDIN FROM [facility] CALLED NEEDING CLARIFICATION ON THE KEPPRA XR RX SHE RECEIVED. SHE STATES ON THE REFILLS, IT STATES 25 AND SHE WANTED TO VERIFY IF THAT WAS CORRECT.      CALL BACK- [phone]

## 2020-11-19 ENCOUNTER — OFFICE VISIT (OUTPATIENT)
Dept: INTERNAL MEDICINE | Facility: CLINIC | Age: 40
End: 2020-11-19

## 2020-11-19 ENCOUNTER — LAB (OUTPATIENT)
Dept: LAB | Facility: HOSPITAL | Age: 40
End: 2020-11-19

## 2020-11-19 VITALS
HEIGHT: 62 IN | BODY MASS INDEX: 37.17 KG/M2 | RESPIRATION RATE: 16 BRPM | SYSTOLIC BLOOD PRESSURE: 112 MMHG | HEART RATE: 79 BPM | TEMPERATURE: 97.3 F | OXYGEN SATURATION: 98 % | DIASTOLIC BLOOD PRESSURE: 78 MMHG | WEIGHT: 202 LBS

## 2020-11-19 DIAGNOSIS — G40.909 SEIZURE DISORDER (HCC): ICD-10-CM

## 2020-11-19 DIAGNOSIS — B37.31 VAGINAL YEAST INFECTION: ICD-10-CM

## 2020-11-19 DIAGNOSIS — Z00.00 WELLNESS EXAMINATION: Primary | ICD-10-CM

## 2020-11-19 DIAGNOSIS — N92.6 IRREGULAR MENSTRUAL CYCLE: ICD-10-CM

## 2020-11-19 LAB
25(OH)D3 SERPL-MCNC: 32.7 NG/ML (ref 30–100)
ALBUMIN SERPL-MCNC: 4.4 G/DL (ref 3.5–5.2)
ALBUMIN/GLOB SERPL: 1.6 G/DL
ALP SERPL-CCNC: 72 U/L (ref 39–117)
ALT SERPL W P-5'-P-CCNC: 34 U/L (ref 1–33)
ANION GAP SERPL CALCULATED.3IONS-SCNC: 8.6 MMOL/L (ref 5–15)
AST SERPL-CCNC: 27 U/L (ref 1–32)
BILIRUB SERPL-MCNC: 0.3 MG/DL (ref 0–1.2)
BUN SERPL-MCNC: 17 MG/DL (ref 6–20)
BUN/CREAT SERPL: 27.9 (ref 7–25)
CALCIUM SPEC-SCNC: 9 MG/DL (ref 8.6–10.5)
CHLORIDE SERPL-SCNC: 103 MMOL/L (ref 98–107)
CHOLEST SERPL-MCNC: 170 MG/DL (ref 0–200)
CO2 SERPL-SCNC: 25.4 MMOL/L (ref 22–29)
CREAT SERPL-MCNC: 0.61 MG/DL (ref 0.57–1)
DEPRECATED RDW RBC AUTO: 43.4 FL (ref 37–54)
ERYTHROCYTE [DISTWIDTH] IN BLOOD BY AUTOMATED COUNT: 12.4 % (ref 12.3–15.4)
FOLATE SERPL-MCNC: 19.4 NG/ML (ref 4.78–24.2)
GFR SERPL CREATININE-BSD FRML MDRD: 110 ML/MIN/1.73
GLOBULIN UR ELPH-MCNC: 2.7 GM/DL
GLUCOSE SERPL-MCNC: 92 MG/DL (ref 65–99)
HBA1C MFR BLD: 5.34 % (ref 4.8–5.6)
HCT VFR BLD AUTO: 41.1 % (ref 34–46.6)
HDLC SERPL-MCNC: 45 MG/DL (ref 40–60)
HGB BLD-MCNC: 14 G/DL (ref 12–15.9)
LDLC SERPL CALC-MCNC: 101 MG/DL (ref 0–100)
LDLC/HDLC SERPL: 2.17 {RATIO}
MCH RBC QN AUTO: 32.2 PG (ref 26.6–33)
MCHC RBC AUTO-ENTMCNC: 34.1 G/DL (ref 31.5–35.7)
MCV RBC AUTO: 94.5 FL (ref 79–97)
PLATELET # BLD AUTO: 194 10*3/MM3 (ref 140–450)
PMV BLD AUTO: 9.9 FL (ref 6–12)
POTASSIUM SERPL-SCNC: 4.4 MMOL/L (ref 3.5–5.2)
PROT SERPL-MCNC: 7.1 G/DL (ref 6–8.5)
RBC # BLD AUTO: 4.35 10*6/MM3 (ref 3.77–5.28)
SODIUM SERPL-SCNC: 137 MMOL/L (ref 136–145)
TRIGL SERPL-MCNC: 137 MG/DL (ref 0–150)
TSH SERPL DL<=0.05 MIU/L-ACNC: 1.8 UIU/ML (ref 0.27–4.2)
VIT B12 BLD-MCNC: 452 PG/ML (ref 211–946)
VLDLC SERPL-MCNC: 24 MG/DL (ref 5–40)
WBC # BLD AUTO: 8.49 10*3/MM3 (ref 3.4–10.8)

## 2020-11-19 PROCEDURE — 90686 IIV4 VACC NO PRSV 0.5 ML IM: CPT | Performed by: NURSE PRACTITIONER

## 2020-11-19 PROCEDURE — 82607 VITAMIN B-12: CPT | Performed by: NURSE PRACTITIONER

## 2020-11-19 PROCEDURE — 99395 PREV VISIT EST AGE 18-39: CPT | Performed by: NURSE PRACTITIONER

## 2020-11-19 PROCEDURE — 90471 IMMUNIZATION ADMIN: CPT | Performed by: NURSE PRACTITIONER

## 2020-11-19 PROCEDURE — 83036 HEMOGLOBIN GLYCOSYLATED A1C: CPT | Performed by: NURSE PRACTITIONER

## 2020-11-19 PROCEDURE — 80053 COMPREHEN METABOLIC PANEL: CPT | Performed by: NURSE PRACTITIONER

## 2020-11-19 PROCEDURE — 84443 ASSAY THYROID STIM HORMONE: CPT | Performed by: NURSE PRACTITIONER

## 2020-11-19 PROCEDURE — 80061 LIPID PANEL: CPT | Performed by: NURSE PRACTITIONER

## 2020-11-19 PROCEDURE — 82306 VITAMIN D 25 HYDROXY: CPT | Performed by: NURSE PRACTITIONER

## 2020-11-19 PROCEDURE — 82746 ASSAY OF FOLIC ACID SERUM: CPT | Performed by: NURSE PRACTITIONER

## 2020-11-19 PROCEDURE — 85027 COMPLETE CBC AUTOMATED: CPT | Performed by: NURSE PRACTITIONER

## 2020-11-19 RX ORDER — FLUCONAZOLE 150 MG/1
TABLET ORAL
Qty: 2 TABLET | Refills: 0 | Status: SHIPPED | OUTPATIENT
Start: 2020-11-19 | End: 2021-03-23

## 2020-12-24 DIAGNOSIS — G40.909 SEIZURE DISORDER (HCC): ICD-10-CM

## 2020-12-24 RX ORDER — FOLIC ACID 1 MG/1
2 TABLET ORAL DAILY
Qty: 60 TABLET | Refills: 5 | Status: SHIPPED | OUTPATIENT
Start: 2020-12-24 | End: 2021-10-25

## 2021-01-04 RX ORDER — ERGOCALCIFEROL 1.25 MG/1
50000 CAPSULE ORAL WEEKLY
Qty: 12 CAPSULE | Refills: 0 | Status: SHIPPED | OUTPATIENT
Start: 2021-01-04 | End: 2021-03-30

## 2021-03-22 PROBLEM — Z01.419 WELL WOMAN EXAM: Status: ACTIVE | Noted: 2021-03-22

## 2021-03-22 PROBLEM — N93.9 ABNORMAL UTERINE BLEEDING (AUB): Status: ACTIVE | Noted: 2021-03-22

## 2021-03-23 ENCOUNTER — LAB (OUTPATIENT)
Dept: LAB | Facility: HOSPITAL | Age: 41
End: 2021-03-23

## 2021-03-23 ENCOUNTER — OFFICE VISIT (OUTPATIENT)
Dept: OBSTETRICS AND GYNECOLOGY | Facility: CLINIC | Age: 41
End: 2021-03-23

## 2021-03-23 VITALS
HEIGHT: 62 IN | DIASTOLIC BLOOD PRESSURE: 84 MMHG | SYSTOLIC BLOOD PRESSURE: 112 MMHG | WEIGHT: 202.6 LBS | BODY MASS INDEX: 37.28 KG/M2

## 2021-03-23 DIAGNOSIS — N93.9 ABNORMAL UTERINE BLEEDING (AUB): Primary | ICD-10-CM

## 2021-03-23 DIAGNOSIS — Z12.4 SCREENING FOR MALIGNANT NEOPLASM OF CERVIX: ICD-10-CM

## 2021-03-23 DIAGNOSIS — N93.9 ABNORMAL UTERINE BLEEDING (AUB): ICD-10-CM

## 2021-03-23 LAB
B-HCG UR QL: NEGATIVE
DEPRECATED RDW RBC AUTO: 43.5 FL (ref 37–54)
ERYTHROCYTE [DISTWIDTH] IN BLOOD BY AUTOMATED COUNT: 12.7 % (ref 12.3–15.4)
HCT VFR BLD AUTO: 40.5 % (ref 34–46.6)
HGB BLD-MCNC: 14 G/DL (ref 12–15.9)
INTERNAL NEGATIVE CONTROL: NEGATIVE
INTERNAL POSITIVE CONTROL: POSITIVE
Lab: NORMAL
MCH RBC QN AUTO: 32 PG (ref 26.6–33)
MCHC RBC AUTO-ENTMCNC: 34.6 G/DL (ref 31.5–35.7)
MCV RBC AUTO: 92.7 FL (ref 79–97)
PLATELET # BLD AUTO: 214 10*3/MM3 (ref 140–450)
PMV BLD AUTO: 9.8 FL (ref 6–12)
RBC # BLD AUTO: 4.37 10*6/MM3 (ref 3.77–5.28)
WBC # BLD AUTO: 9.24 10*3/MM3 (ref 3.4–10.8)

## 2021-03-23 PROCEDURE — 85027 COMPLETE CBC AUTOMATED: CPT

## 2021-03-23 PROCEDURE — 84403 ASSAY OF TOTAL TESTOSTERONE: CPT

## 2021-03-23 PROCEDURE — 82670 ASSAY OF TOTAL ESTRADIOL: CPT

## 2021-03-23 PROCEDURE — 99203 OFFICE O/P NEW LOW 30 MIN: CPT | Performed by: OBSTETRICS & GYNECOLOGY

## 2021-03-23 PROCEDURE — 84402 ASSAY OF FREE TESTOSTERONE: CPT

## 2021-03-23 PROCEDURE — 84443 ASSAY THYROID STIM HORMONE: CPT

## 2021-03-23 PROCEDURE — 81025 URINE PREGNANCY TEST: CPT | Performed by: OBSTETRICS & GYNECOLOGY

## 2021-03-23 PROCEDURE — 83001 ASSAY OF GONADOTROPIN (FSH): CPT

## 2021-03-23 PROCEDURE — 84146 ASSAY OF PROLACTIN: CPT

## 2021-03-23 NOTE — PROGRESS NOTES
"Subjective   Chief Complaint   Patient presents with   • [facility]     New pt, referred for irregular periods     Val Bloom is a 40 y.o. year old .  Patient's last menstrual period was 2021 (exact date).  She presents to be seen because of issues with menstrual cycles.  Patient reports since November her cycles has been lasting for longer duration and she has skipped 1 or 2 months.  Ports prior to this her cycles were monthly with duration about 3 to 4 days.  Duration recently since November has been anywhere from 6 to 11 days.  She is currently on her cycle just started today.  She reports the flow has been about the same with no increase in heaviness.  She reports she is not currently sexually active.  She does report some recent weight gain in the past 6 to 12 months.  She reports she is never used any type of contraception in the past.    OTHER THINGS SHE WANTS TO DISCUSS TODAY:  Nothing else    The following portions of the patient's history were reviewed and updated as appropriate:current medications, allergies, past family history, past medical history, past social history and past surgical history    Social History    Tobacco Use      Smoking status: Never Smoker      Smokeless tobacco: Never Used    Review of Systems  Constitutional POS: nothing reported    NEG: anorexia or night sweats   Genitourinary POS: nothing reported    NEG: dysuria or hematuria   Gastointestinal POS: nothing reported    NEG: bloating, change in bowel habits, melena or reflux symptoms   Integument POS: nothing reported    NEG: moles that are changing in size, shape, color or rashes   Breast POS: nothing reported    NEG: persistent breast lump, skin dimpling or nipple discharge         Objective   /84   Ht 157.5 cm (62\")   Wt 91.9 kg (202 lb 9.6 oz)   LMP 2021 (Exact Date)   Breastfeeding No   BMI 37.06 kg/m²     General:  well developed; well nourished  no acute distress   Skin:  Not " performed.   Thyroid: normal to inspection and palpation   Lungs:  breathing is unlabored   Heart:  Not performed.   Breasts:  Not performed.   Abdomen: soft, non-tender; no masses  no umbilical or inguinal hernias are present  no hepato-splenomegaly   Pelvis: Clinical staff was present for exam  External genitalia:  normal appearance of the external genitalia including Bartholin's and Lorimor's glands.  :  urethral meatus normal;  Vaginal:  normal pink mucosa without prolapse or lesions.  Cervix:  normal appearance.  Uterus:  normal size, shape and consistency.  Adnexa:  normal bimanual exam of the adnexa.   Patient tolerability of the exam limited and had to use yellow speculum.     Lab Review   No data reviewed    Imaging   No data reviewed        Assessment   1. Abnormal uterine bleeding that needs further work-up.  Suspect there could be an anovulatory cycle component given recent weight gain  2. Cervical cancer screening      Plan   1. Recommend that we proceed with pelvic ultrasound and visit to further assess  Pap and HPV were done today.  If she does not receive the results of the Pap within 2 weeks  time, she was instructed to call to find out the results.  I explained to Val that the recommendations for Pap smear interval in a low risk patient's has lengthened to 5 years time if both cytology and HPV testing were normal.  I encouraged her to be seen yearly for a full physical exam including breast and pelvic exam even during the off years when PAP's will not be performed.  The following tests were ordered today: see below listed labs.  It was explained to Val that all lab test should be back within the one week after they are performed. She will be notified about the results, regardless of the findings. If she has not been contacted by the office within 2 weeks after the test has been performed, it is her responsibility to contact us to learn about her results.  The importance of keeping all planned  follow-up and taking all medications as prescribed was emphasized.  Follow up with ultrasound and visit.  Patient potentially may need an endometrial biopsy but I am unsure whether she would tolerate this in the office.    No orders of the defined types were placed in this encounter.       Orders Placed This Encounter   Procedures   • US Non-ob Transvaginal     Standing Status:   Future     Standing Expiration Date:   3/23/2022     Order Specific Question:   Reason for Exam:     Answer:   irregular menses   • TSH Rfx On Abnormal To Free T4     Standing Status:   Future     Number of Occurrences:   1     Standing Expiration Date:   3/23/2022   • Follicle Stimulating Hormone     Standing Status:   Future     Number of Occurrences:   1     Standing Expiration Date:   3/23/2022   • Estradiol     Standing Status:   Future     Number of Occurrences:   1     Standing Expiration Date:   3/23/2022   • CBC (No Diff)     Standing Status:   Future     Number of Occurrences:   1     Standing Expiration Date:   3/23/2022   • Prolactin     Standing Status:   Future     Number of Occurrences:   1     Standing Expiration Date:   3/23/2022   • Testosterone - total     Standing Status:   Future     Number of Occurrences:   1     Standing Expiration Date:   3/23/2022   • Testosterone Free Direct     Standing Status:   Future     Number of Occurrences:   1     Standing Expiration Date:   3/23/2022   • POC Pregnancy, Urine       This note was electronically signed.    Latonia Gracia MD  March 23, 2021    Note: Speech recognition transcription software may have been used to create portions of this document.  An attempt at proofreading has been made but errors in transcription could still be present.

## 2021-03-24 LAB
ESTRADIOL SERPL HS-MCNC: 57.8 PG/ML
FSH SERPL-ACNC: 8.59 MIU/ML
PROLACTIN SERPL-MCNC: 12.1 NG/ML (ref 4.79–23.3)
TESTOST SERPL-MCNC: 28.6 NG/DL (ref 8.4–48.1)
TSH SERPL DL<=0.05 MIU/L-ACNC: 1.29 UIU/ML (ref 0.27–4.2)

## 2021-03-27 LAB — TESTOST FREE SERPL-MCNC: 2.9 PG/ML (ref 0–4.2)

## 2021-03-30 RX ORDER — ERGOCALCIFEROL 1.25 MG/1
CAPSULE ORAL
Qty: 4 CAPSULE | Refills: 1 | Status: SHIPPED | OUTPATIENT
Start: 2021-03-30 | End: 2021-05-24

## 2021-04-27 ENCOUNTER — TELEPHONE (OUTPATIENT)
Dept: OBSTETRICS AND GYNECOLOGY | Facility: CLINIC | Age: 41
End: 2021-04-27

## 2021-05-24 RX ORDER — ERGOCALCIFEROL 1.25 MG/1
CAPSULE ORAL
Qty: 4 CAPSULE | Refills: 1 | Status: SHIPPED | OUTPATIENT
Start: 2021-05-24 | End: 2021-07-19

## 2021-07-19 RX ORDER — ERGOCALCIFEROL 1.25 MG/1
CAPSULE ORAL
Qty: 4 CAPSULE | Refills: 1 | Status: SHIPPED | OUTPATIENT
Start: 2021-07-19 | End: 2022-01-13

## 2021-08-17 RX ORDER — ERGOCALCIFEROL 1.25 MG/1
CAPSULE ORAL
Qty: 4 CAPSULE | Refills: 1 | OUTPATIENT
Start: 2021-08-17

## 2021-08-20 ENCOUNTER — TELEPHONE (OUTPATIENT)
Dept: NEUROLOGY | Facility: CLINIC | Age: 41
End: 2021-08-20

## 2021-10-24 DIAGNOSIS — G40.909 SEIZURE DISORDER (HCC): ICD-10-CM

## 2021-10-25 RX ORDER — FOLIC ACID 1 MG/1
2 TABLET ORAL DAILY
Qty: 180 TABLET | Refills: 1 | Status: SHIPPED | OUTPATIENT
Start: 2021-10-25 | End: 2022-01-13

## 2021-10-25 RX ORDER — LEVETIRACETAM 500 MG/1
500 TABLET, EXTENDED RELEASE ORAL DAILY
Qty: 90 TABLET | Refills: 1 | Status: SHIPPED | OUTPATIENT
Start: 2021-10-25 | End: 2022-01-13

## 2021-10-25 NOTE — TELEPHONE ENCOUNTER
Please schedule follow up IN OFFICE with first available provider. This is a prior Dr. Pringle patient. I will refill her medicines through February 2022 due to long wait for appointment. Thanks, JIMENEZ Alejandre

## 2021-10-25 NOTE — TELEPHONE ENCOUNTER
Rx Refill Note  Requested Prescriptions     Pending Prescriptions Disp Refills   • folic acid (FOLVITE) 1 MG tablet [Pharmacy Med Name: FOLIC ACID 1MG TABLETS] 60 tablet 5     Sig: TAKE 2 TABLETS BY MOUTH DAILY   • levETIRAcetam XR (KEPPRA XR) 500 MG 24 hr tablet [Pharmacy Med Name: LEVETIRACETAM ER 500MG TABLETS] 30 tablet 25     Sig: TAKE 1 TABLET BY MOUTH DAILY      Last office visit with prescribing clinician: 10/1/2020      Next office visit with prescribing clinician: Halie F/U scheduled           Rachele Camarillo CMA  10/25/21, 08:15 EDT

## 2022-01-13 ENCOUNTER — HOSPITAL ENCOUNTER (EMERGENCY)
Facility: HOSPITAL | Age: 42
Discharge: HOME | End: 2022-01-13
Attending: EMERGENCY MEDICINE | Admitting: EMERGENCY MEDICINE

## 2022-01-13 ENCOUNTER — APPOINTMENT (OUTPATIENT)
Dept: GENERAL RADIOLOGY | Facility: HOSPITAL | Age: 42
End: 2022-01-13

## 2022-01-13 ENCOUNTER — OFFICE VISIT (OUTPATIENT)
Dept: NEUROLOGY | Facility: CLINIC | Age: 42
End: 2022-01-13

## 2022-01-13 VITALS
DIASTOLIC BLOOD PRESSURE: 70 MMHG | OXYGEN SATURATION: 98 % | BODY MASS INDEX: 35.97 KG/M2 | HEIGHT: 63 IN | SYSTOLIC BLOOD PRESSURE: 122 MMHG | HEART RATE: 94 BPM | WEIGHT: 203 LBS

## 2022-01-13 VITALS
DIASTOLIC BLOOD PRESSURE: 98 MMHG | HEART RATE: 100 BPM | HEIGHT: 63 IN | BODY MASS INDEX: 36.32 KG/M2 | TEMPERATURE: 97.9 F | OXYGEN SATURATION: 98 % | SYSTOLIC BLOOD PRESSURE: 144 MMHG | RESPIRATION RATE: 20 BRPM | WEIGHT: 205 LBS

## 2022-01-13 DIAGNOSIS — S51.812A LACERATION OF LEFT FOREARM, INITIAL ENCOUNTER: Primary | ICD-10-CM

## 2022-01-13 DIAGNOSIS — G43.009 MIGRAINE WITHOUT AURA AND WITHOUT STATUS MIGRAINOSUS, NOT INTRACTABLE: ICD-10-CM

## 2022-01-13 DIAGNOSIS — G40.909 SEIZURE DISORDER: Primary | ICD-10-CM

## 2022-01-13 PROCEDURE — 99283 EMERGENCY DEPT VISIT LOW MDM: CPT

## 2022-01-13 PROCEDURE — 73090 X-RAY EXAM OF FOREARM: CPT

## 2022-01-13 PROCEDURE — 90471 IMMUNIZATION ADMIN: CPT | Performed by: PHYSICIAN ASSISTANT

## 2022-01-13 PROCEDURE — 99213 OFFICE O/P EST LOW 20 MIN: CPT | Performed by: NURSE PRACTITIONER

## 2022-01-13 PROCEDURE — 25010000002 TETANUS-DIPHTH-ACELL PERTUSSIS 5-2.5-18.5 LF-MCG/0.5 SUSPENSION PREFILLED SYRINGE: Performed by: PHYSICIAN ASSISTANT

## 2022-01-13 PROCEDURE — 90715 TDAP VACCINE 7 YRS/> IM: CPT | Performed by: PHYSICIAN ASSISTANT

## 2022-01-13 PROCEDURE — 0 LIDOCAINE 1 % SOLUTION: Performed by: PHYSICIAN ASSISTANT

## 2022-01-13 RX ORDER — FOLIC ACID 1 MG/1
2 TABLET ORAL DAILY
Qty: 180 TABLET | Refills: 3 | Status: SHIPPED | OUTPATIENT
Start: 2022-01-13 | End: 2023-01-13

## 2022-01-13 RX ORDER — LIDOCAINE HYDROCHLORIDE 10 MG/ML
10 INJECTION, SOLUTION INFILTRATION; PERINEURAL ONCE
Status: COMPLETED | OUTPATIENT
Start: 2022-01-13 | End: 2022-01-13

## 2022-01-13 RX ORDER — SUMATRIPTAN 50 MG/1
TABLET, FILM COATED ORAL
Qty: 27 TABLET | Refills: 3 | Status: SHIPPED | OUTPATIENT
Start: 2022-01-13 | End: 2023-01-13

## 2022-01-13 RX ORDER — LEVETIRACETAM 500 MG/1
500 TABLET, EXTENDED RELEASE ORAL DAILY
Qty: 90 TABLET | Refills: 3 | Status: SHIPPED | OUTPATIENT
Start: 2022-01-13 | End: 2023-01-13

## 2022-01-13 RX ORDER — CEPHALEXIN 500 MG/1
500 CAPSULE ORAL 3 TIMES DAILY
Qty: 15 CAPSULE | Refills: 0 | Status: SHIPPED | OUTPATIENT
Start: 2022-01-13 | End: 2022-01-18

## 2022-01-13 RX ORDER — CEPHALEXIN 250 MG/1
500 CAPSULE ORAL ONCE
Status: COMPLETED | OUTPATIENT
Start: 2022-01-13 | End: 2022-01-13

## 2022-01-13 RX ADMIN — TETANUS TOXOID, REDUCED DIPHTHERIA TOXOID AND ACELLULAR PERTUSSIS VACCINE, ADSORBED 0.5 ML: 5; 2.5; 8; 8; 2.5 SUSPENSION INTRAMUSCULAR at 21:20

## 2022-01-13 RX ADMIN — CEPHALEXIN 500 MG: 250 CAPSULE ORAL at 22:46

## 2022-01-13 RX ADMIN — LIDOCAINE HYDROCHLORIDE 10 ML: 10 INJECTION, SOLUTION INFILTRATION; PERINEURAL at 21:33

## 2022-01-13 NOTE — PROGRESS NOTES
Subjective:     Patient ID: Val Bloom is a 41 y.o. female.    CC:   Chief Complaint   Patient presents with   • Seizures       HPI:   History of Present Illness   This is a 41-year-old female who presents for one-year follow-up on 2 seizure episodes once in first grade and then the last time in 2015.  She is currently well controlled on levetiracetam  mg daily.  She is also taking folic acid 2 mg daily.  She is planning to eventually have children.  She is not on birth control. She does not have any children at this time.  She is  and is sexually active at times.  She had an EEG in 2016 which was normal.  She had an MRI in 2016 which showed several small bifrontal gliotic foci felt to be related to migraine.  She tells me that she does have 1-2 headaches a month that occur over 1 side of head, she has some nausea, a little bit of light sensitivity, mild to moderate pain, lasting only 2 to 2-1/2 hours and she will take an Aleve or Excedrin Migraine and this helps. She did have a stronger migraine last week and this lasted 5.5 hours and took excedrin and went to sleep. When she woke up the headache was done. Could have been weather related and stress work together. Her mother has epilepsy & migraines. She has not tried a triptan.  For one seizure she had when she was a child was a generalized convulsive seizure.  The second time she had a seizure was witnessed by her  and was in the night when she would not respond to him and was staring and unable to speak. Previously followed long term by Dr. Pringle.  She had blood work with her primary care provider 11/19/202 CMP essentially normal, CBC without differential within normal limits, lipid panel with  otherwise normal, TSH normal, hemoglobin A1c 5.34, vitamin B12 452 and folate 19.4, and vitamin D 32.7.  She plans to call her PCP to schedule annual physical and mammogram this year.    The following portions of the patient's history  were reviewed and updated as appropriate: allergies, current medications, past family history, past medical history, past social history, past surgical history and problem list.    Past Medical History:   Diagnosis Date   • Aspiration pneumonia (HCC) 9/15/2016   • Chronic left-sided low back pain with left-sided sciatica    • Migraine without aura    • Seizures (HCC)     first grade initial and then 2nd in 2015       Past Surgical History:   Procedure Laterality Date   • NO PAST SURGERIES         Social History     Socioeconomic History   • Marital status:    Tobacco Use   • Smoking status: Never Smoker   • Smokeless tobacco: Never Used   Vaping Use   • Vaping Use: Never used   Substance and Sexual Activity   • Alcohol use: Not Currently     Alcohol/week: 0.0 standard drinks     Comment: Occasional   • Drug use: No   • Sexual activity: Yes     Partners: Male     Birth control/protection: None       Family History   Problem Relation Age of Onset   • Alcohol abuse Mother    • Alzheimer's disease Mother    • Stroke Mother    • Seizures Mother         Epilepsy   • Hypertension Mother    • Cancer Mother         History of malignant neoplasm   • Migraines Mother    • Breast cancer Paternal Aunt    • Ovarian cancer Neg Hx    • Uterine cancer Neg Hx    • Colon cancer Neg Hx    • Osteoporosis Neg Hx         Review of Systems   Constitutional: Negative for chills, fatigue, fever and unexpected weight change.   HENT: Negative for ear pain, hearing loss, nosebleeds, rhinorrhea and sore throat.    Eyes: Negative for photophobia, pain, discharge, itching and visual disturbance.   Respiratory: Negative for cough, chest tightness, shortness of breath and wheezing.    Cardiovascular: Negative for chest pain, palpitations and leg swelling.   Gastrointestinal: Negative for abdominal pain, blood in stool, constipation, diarrhea, nausea and vomiting.   Genitourinary: Negative for dysuria, frequency, hematuria and urgency.  "  Musculoskeletal: Negative for arthralgias, back pain, gait problem, joint swelling, myalgias, neck pain and neck stiffness.   Skin: Negative for rash and wound.   Allergic/Immunologic: Negative for environmental allergies and food allergies.   Neurological: Positive for headaches. Negative for dizziness, tremors, seizures, syncope, speech difficulty, weakness, light-headedness and numbness.   Hematological: Negative for adenopathy. Does not bruise/bleed easily.   Psychiatric/Behavioral: Negative for agitation, confusion, decreased concentration, hallucinations, sleep disturbance and suicidal ideas. The patient is not nervous/anxious.    All other systems reviewed and are negative.       Objective:  /70   Pulse 94   Ht 160 cm (63\")   Wt 92.1 kg (203 lb)   SpO2 98%   BMI 35.96 kg/m²     Neurologic Exam     Mental Status   Oriented to person, place, and time.   Speech: speech is normal   Level of consciousness: alert    Cranial Nerves   Cranial nerves II through XII intact.     Motor Exam   Muscle bulk: normal  Overall muscle tone: normal    Strength   Strength 5/5 throughout.     Gait, Coordination, and Reflexes     Gait  Gait: normal    Coordination   Finger to nose coordination: normal    Tremor   Resting tremor: absent  Intention tremor: absent  Action tremor: absent    Reflexes   Right brachioradialis: 2+  Left brachioradialis: 2+  Right biceps: 2+  Left biceps: 2+  Right patellar: 2+  Left patellar: 2+  Right achilles: 2+  Left achilles: 2+  Right : 2+  Left : 2+      Physical Exam  Constitutional:       Appearance: Normal appearance.   Neurological:      Mental Status: She is alert and oriented to person, place, and time.      Coordination: Finger-Nose-Finger Test normal.      Gait: Gait is intact.      Deep Tendon Reflexes: Strength normal.      Reflex Scores:       Bicep reflexes are 2+ on the right side and 2+ on the left side.       Brachioradialis reflexes are 2+ on the right side and " 2+ on the left side.       Patellar reflexes are 2+ on the right side and 2+ on the left side.       Achilles reflexes are 2+ on the right side and 2+ on the left side.  Psychiatric:         Mood and Affect: Mood and affect normal.         Speech: Speech normal.         Assessment/Plan:       Diagnoses and all orders for this visit:    1. Seizure disorder (HCC) (Primary)  -     folic acid (FOLVITE) 1 MG tablet; Take 2 tablets by mouth Daily.  Dispense: 180 tablet; Refill: 3  -     levETIRAcetam XR (KEPPRA XR) 500 MG 24 hr tablet; Take 1 tablet by mouth Daily.  Dispense: 90 tablet; Refill: 3    2. Migraine without aura and without status migrainosus, not intractable  -     SUMAtriptan (Imitrex) 50 MG tablet; Take one tablet at onset of headache. May repeat dose one time in 2 hours if headache not relieved.  Dispense: 27 tablet; Refill: 3           Continue current medications.  We'll add sumatriptan as needed for migraines.  Follow-up in 1 year or sooner if needed.  Reviewed medications, potential side effects and signs and symptoms to report. Discussed risk versus benefits of treatment plan with patient and/or family-including medications, labs and radiology that may be ordered. Addressed questions and concerns during visit. Patient and/or family verbalized understanding and agree with plan.    AS THE PROVIDER, I PERSONALLY WORE PPE DURING ENTIRE FACE TO FACE ENCOUNTER IN CLINIC WITH THE PATIENT. PATIENT ALSO WORE PPE DURING ENTIRE FACE TO FACE ENCOUNTER EXCEPT FOR A MAX OF 30 SECONDS DURING NEUROLOGICAL EVALUATION OF CRANIAL NERVES AND THEN MASK WAS PLACED BACK OVER PATIENT FACE FOR REMAINDER OF VISIT. I WASHED MY HANDS BEFORE AND AFTER VISIT.    Patient instructions include: No driving or operating heavy machinery, solo bathing or tub baths for 90 days from onset of most recent seizure, if they currently hold a license. Minimize stress as much as possible. Recommended 7-8 hours of sleep each night. Abstain from  "alcohol intake. Educated on Antiepileptic medications with possible side effects and signs and symptoms to report if prescribed during visit. Instructed to take seizure medication daily if prescribed. Reviewed potential seizure risk factors. Instructed to call 911 or our office if another seizure does occur.    I also discussed SUDEP risk with the patient and/or family today. Sudden Unexpected Death in Epilepsy is defined as \"the sudden, unexpected, witnessed or unwitnessed, non-traumatic, and non-drowning death in patients with epilepsy with or without evidence for a seizure, and excluding documented status epilepticus, in which postmortem examination does not reveal a structural or toxicological cause for death.\"-Epilepsy Foundation 2021. SUDEP is most commonly seen in Epilepsy patient's who's seizures are not well controlled or who have seizures during their sleep. This condition is not fully understood. To reduce your risk of SUDEP, please take your seizure medications as prescribed, keep a diary of your seizures and when they occur and if your seizures are not well controlled, please notify us so we can collaborate with you to get your seizures better controlled. If you have additional questions, please visit: [email or web address] for more information.    During this visit the following were done:  Labs Reviewed [x]    Labs Ordered []    Radiology Reports Reviewed []    Radiology Ordered []    PCP Records Reviewed [x]    Referring Provider Records Reviewed []    ER Records Reviewed []    Hospital Records Reviewed []    History Obtained From Family []    Radiology Images Reviewed []    Other Reviewed [x]    Records Requested []      Yara Jensen, APRSENG  1/13/2022        "

## 2022-01-14 NOTE — ED PROVIDER NOTES
Subjective   41-year-old female accompanied by her  reports to the emergency room with a laceration to her left forearm post fall.  Patient reports she was carrying a wine glass and tripped over a pair of shoes causing her to fall.  The wine glass shattered and cut her arm.  She did not lose consciousness.  Patient reports her tetanus is not up to date.  She has no other complaints at this time.      History provided by:  Patient and spouse   used: No        Review of Systems   Skin: Positive for wound.        Laceration to left forearm   All other systems reviewed and are negative.      Past Medical History:   Diagnosis Date   • Aspiration pneumonia (HCC) 9/15/2016   • Chronic left-sided low back pain with left-sided sciatica    • Migraine without aura    • Seizures (HCC)     first grade initial and then 2nd in 2015       No Known Allergies    Past Surgical History:   Procedure Laterality Date   • NO PAST SURGERIES         Family History   Problem Relation Age of Onset   • Alcohol abuse Mother    • Alzheimer's disease Mother    • Stroke Mother    • Seizures Mother         Epilepsy   • Hypertension Mother    • Cancer Mother         History of malignant neoplasm   • Migraines Mother    • Breast cancer Paternal Aunt    • Ovarian cancer Neg Hx    • Uterine cancer Neg Hx    • Colon cancer Neg Hx    • Osteoporosis Neg Hx        Social History     Socioeconomic History   • Marital status:    Tobacco Use   • Smoking status: Never Smoker   • Smokeless tobacco: Never Used   Vaping Use   • Vaping Use: Never used   Substance and Sexual Activity   • Alcohol use: Not Currently     Alcohol/week: 0.0 standard drinks     Comment: Occasional   • Drug use: No   • Sexual activity: Yes     Partners: Male     Birth control/protection: None           Objective   Physical Exam  Vitals and nursing note reviewed.   Constitutional:       Appearance: Normal appearance. She is well-developed.   HENT:       Head: Normocephalic.   Cardiovascular:      Rate and Rhythm: Normal rate and regular rhythm.      Heart sounds: Normal heart sounds.   Pulmonary:      Effort: Pulmonary effort is normal.      Breath sounds: Normal breath sounds.   Musculoskeletal:         General: Normal range of motion.      Cervical back: Normal range of motion.   Skin:     General: Skin is warm and dry.             Comments: 2 cm laceration to the left forearm   Neurological:      Mental Status: She is alert and oriented to person, place, and time.      Deep Tendon Reflexes: Reflexes are normal and symmetric.   Psychiatric:         Mood and Affect: Mood normal.         Laceration Repair    Date/Time: 1/13/2022 10:28 PM  Performed by: Mal Rivers Jr., PA-C  Authorized by: Rossy Larsen MD     Consent:     Consent obtained:  Verbal    Consent given by:  Patient    Risks discussed:  Pain, poor cosmetic result and poor wound healing  Anesthesia (see MAR for exact dosages):     Anesthesia method:  None  Laceration details:     Location:  Shoulder/arm    Shoulder/arm location:  L lower arm    Length (cm):  3  Repair type:     Repair type:  Simple  Exploration:     Hemostasis achieved with:  Direct pressure    Contaminated: no    Treatment:     Area cleansed with:  Hibiclens    Amount of cleaning:  Standard    Irrigation method:  Syringe  Skin repair:     Repair method:  Sutures    Suture size:  4-0    Suture material:  Nylon    Suture technique:  Running locked    Number of sutures:  3  Approximation:     Approximation:  Close  Post-procedure details:     Dressing:  Sterile dressing    Patient tolerance of procedure:  Tolerated well, no immediate complications               ED Course                                                 MDM  Number of Diagnoses or Management Options  Laceration of left forearm, initial encounter: new and requires workup     Amount and/or Complexity of Data Reviewed  Clinical lab tests: reviewed  Tests in the  radiology section of CPT®: reviewed  Discuss the patient with other providers: yes  Independent visualization of images, tracings, or specimens: yes    Risk of Complications, Morbidity, and/or Mortality  Presenting problems: minimal  Diagnostic procedures: minimal  Management options: minimal    Patient Progress  Patient progress: stable      Final diagnoses:   Laceration of left forearm, initial encounter       ED Disposition  ED Disposition     ED Disposition Condition Comment    Discharge Stable           [facility]  [address] [facility] [address] [phone]    For suture removal         Medication List      New Prescriptions    cephalexin 500 MG capsule  Commonly known as: KEFLEX  Take 1 capsule by mouth 3 (Three) Times a Day for 5 days.           Where to Get Your Medications      These medications were sent to [id number] - [place], [place] - [address] AT [facility] [address] - [phone] PH - [phone] FX  [address], [facility] [address]    Phone: [phone]   · cephalexin 500 MG capsule          Mal Rivers Jr., PA-C  01/13/22 1060

## 2022-01-20 ENCOUNTER — OFFICE VISIT (OUTPATIENT)
Dept: FAMILY MEDICINE CLINIC | Facility: CLINIC | Age: 42
End: 2022-01-20

## 2022-01-20 DIAGNOSIS — Z48.02 VISIT FOR SUTURE REMOVAL: Primary | ICD-10-CM

## 2022-01-20 NOTE — PROGRESS NOTES
Patient is here to have her stitches removed from her posterior left forearm.  She was seen in the emergency department at [facility] on January 13.  She has 3 stitches in place with a yellowish appearing scab partially covering the stitches.  The sutures were removed with ease and covered the wound with a Band-Aid.

## 2022-05-02 ENCOUNTER — OFFICE VISIT (OUTPATIENT)
Dept: INTERNAL MEDICINE | Facility: CLINIC | Age: 42
End: 2022-05-02

## 2022-05-02 VITALS
RESPIRATION RATE: 16 BRPM | HEIGHT: 63 IN | WEIGHT: 202 LBS | OXYGEN SATURATION: 99 % | TEMPERATURE: 98.4 F | BODY MASS INDEX: 35.79 KG/M2 | HEART RATE: 90 BPM | DIASTOLIC BLOOD PRESSURE: 78 MMHG | SYSTOLIC BLOOD PRESSURE: 118 MMHG

## 2022-05-02 DIAGNOSIS — Z12.31 ENCOUNTER FOR SCREENING MAMMOGRAM FOR BREAST CANCER: ICD-10-CM

## 2022-05-02 DIAGNOSIS — M79.671 RIGHT FOOT PAIN: ICD-10-CM

## 2022-05-02 DIAGNOSIS — M72.2 PLANTAR FASCIITIS: Primary | ICD-10-CM

## 2022-05-02 PROCEDURE — 99213 OFFICE O/P EST LOW 20 MIN: CPT | Performed by: NURSE PRACTITIONER

## 2022-05-02 NOTE — PROGRESS NOTES
Subjective   Chief Complaint   Patient presents with   • Foot Pain     Right x 3 months          Val Bloom is a 42 y.o. female here today for right foot pain. Has right lateral foot pain for the past 2-3 months. This starts at heel and runs to the base of toes. This hurts with ambulation and sometimes at rest especially if foot touches something causing pressure. No recent injuries. She has done some foot stretches that hasn't helped. Has not taken any OTC meds yet. Wearing supportive shoes.     I have reviewed the following portions of the patient's history and confirmed they are accurate: allergies, current medications, past family history, past medical history, past social history, past surgical history and problem list    I have personally completed the patient's review of systems.    Review of Systems   Constitutional: Positive for fatigue. Negative for activity change, appetite change, chills, diaphoresis, fever, unexpected weight gain and unexpected weight loss.   HENT: Negative for ear discharge, ear pain, mouth sores, nosebleeds, sinus pressure, sneezing and sore throat.    Eyes: Negative for pain, discharge and itching.   Respiratory: Negative for cough, chest tightness, shortness of breath and wheezing.    Cardiovascular: Negative for chest pain, palpitations and leg swelling.   Gastrointestinal: Negative for abdominal pain, constipation, diarrhea, nausea and vomiting.   Endocrine: Negative for heat intolerance, polydipsia and polyphagia.   Genitourinary: Positive for menstrual problem. Negative for dysuria, flank pain, frequency, hematuria and urgency.   Musculoskeletal: Positive for arthralgias, back pain and myalgias. Negative for gait problem, joint swelling, neck pain and neck stiffness.   Skin: Negative for color change, pallor and rash.   Allergic/Immunologic: Negative for immunocompromised state.   Neurological: Negative for seizures, speech difficulty, weakness and numbness.  "  Hematological: Negative for adenopathy.   Psychiatric/Behavioral: Negative for agitation, decreased concentration, sleep disturbance and depressed mood. The patient is not nervous/anxious.        Current Outpatient Medications on File Prior to Visit   Medication Sig   • folic acid (FOLVITE) 1 MG tablet Take 2 tablets by mouth Daily.   • levETIRAcetam XR (KEPPRA XR) 500 MG 24 hr tablet Take 1 tablet by mouth Daily.   • SUMAtriptan (Imitrex) 50 MG tablet Take one tablet at onset of headache. May repeat dose one time in 2 hours if headache not relieved.     No current facility-administered medications on file prior to visit.       Objective   Vitals:    05/02/22 1331   BP: 118/78   Pulse: 90   Resp: 16   Temp: 98.4 °F (36.9 °C)   TempSrc: Temporal   SpO2: 99%   Weight: 91.6 kg (202 lb)   Height: 160 cm (63\")     Body mass index is 35.78 kg/m².    Physical Exam  Vitals reviewed.   Constitutional:       Appearance: Normal appearance. She is well-developed.   HENT:      Head: Normocephalic and atraumatic.      Nose: Nose normal.   Eyes:      General: Lids are normal.      Conjunctiva/sclera: Conjunctivae normal.      Pupils: Pupils are equal, round, and reactive to light.   Neck:      Thyroid: No thyromegaly.      Trachea: Trachea normal.   Pulmonary:      Effort: Pulmonary effort is normal. No respiratory distress.   Musculoskeletal:      Right foot: Normal range of motion. Tenderness present. No swelling or deformity.   Skin:     General: Skin is warm and dry.   Neurological:      Mental Status: She is alert and oriented to person, place, and time.      GCS: GCS eye subscore is 4. GCS verbal subscore is 5. GCS motor subscore is 6.   Psychiatric:         Attention and Perception: Attention normal.         Mood and Affect: Mood normal.         Speech: Speech normal.         Behavior: Behavior normal. Behavior is cooperative.         Assessment/Plan   Problem List Items Addressed This Visit    None     Visit Diagnoses  "    Plantar fasciitis    -  Primary  New issue unstable. Start IBU OTC PRN for inflammation and pain. Discussed foot stretches and importance of wearing supportive shoes. Will refer to podiatry if xray is normal.       Right foot pain      New issue unstable. Start IBU OTC PRN for pain. Xray ordered. Will refer to ortho or podiatry based on results.       Relevant Orders    XR Foot 3+ View Right    Encounter for screening mammogram for breast cancer        Relevant Orders    Mammo screening digital tomosynthesis bilateral w CAD             Current Outpatient Medications:   •  folic acid (FOLVITE) 1 MG tablet, Take 2 tablets by mouth Daily., Disp: 180 tablet, Rfl: 3  •  levETIRAcetam XR (KEPPRA XR) 500 MG 24 hr tablet, Take 1 tablet by mouth Daily., Disp: 90 tablet, Rfl: 3  •  SUMAtriptan (Imitrex) 50 MG tablet, Take one tablet at onset of headache. May repeat dose one time in 2 hours if headache not relieved., Disp: 27 tablet, Rfl: 3       Plan of care reviewed with the patient at the conclusion of today's visit.  Education was provided regarding diagnosis, management, and any prescribed or recommended OTC medications.  Patient verbalized understanding of and agreement with management plan.     No follow-ups on file.      JIMENEZ Rios

## 2022-05-03 ENCOUNTER — HOSPITAL ENCOUNTER (OUTPATIENT)
Dept: GENERAL RADIOLOGY | Facility: HOSPITAL | Age: 42
Discharge: HOME | End: 2022-05-03
Admitting: NURSE PRACTITIONER

## 2022-05-03 DIAGNOSIS — M79.671 RIGHT FOOT PAIN: ICD-10-CM

## 2022-05-03 PROCEDURE — 73630 X-RAY EXAM OF FOOT: CPT

## 2022-05-09 ENCOUNTER — OFFICE VISIT (OUTPATIENT)
Dept: OBSTETRICS AND GYNECOLOGY | Facility: CLINIC | Age: 42
End: 2022-05-09

## 2022-05-09 ENCOUNTER — LAB (OUTPATIENT)
Dept: LAB | Facility: HOSPITAL | Age: 42
End: 2022-05-09

## 2022-05-09 VITALS
HEIGHT: 62 IN | BODY MASS INDEX: 37.25 KG/M2 | DIASTOLIC BLOOD PRESSURE: 70 MMHG | WEIGHT: 202.4 LBS | SYSTOLIC BLOOD PRESSURE: 110 MMHG

## 2022-05-09 DIAGNOSIS — N93.9 ABNORMAL UTERINE BLEEDING (AUB): ICD-10-CM

## 2022-05-09 DIAGNOSIS — N93.9 ABNORMAL UTERINE BLEEDING (AUB): Primary | ICD-10-CM

## 2022-05-09 LAB
DEPRECATED RDW RBC AUTO: 41.9 FL (ref 37–54)
ERYTHROCYTE [DISTWIDTH] IN BLOOD BY AUTOMATED COUNT: 12.4 % (ref 12.3–15.4)
ESTRADIOL SERPL HS-MCNC: 57.1 PG/ML
FSH SERPL-ACNC: 5.16 MIU/ML
HCG INTACT+B SERPL-ACNC: <0.1 MIU/ML
HCT VFR BLD AUTO: 41.8 % (ref 34–46.6)
HGB BLD-MCNC: 14.4 G/DL (ref 12–15.9)
LH SERPL-ACNC: 3.46 MIU/ML
MCH RBC QN AUTO: 31.9 PG (ref 26.6–33)
MCHC RBC AUTO-ENTMCNC: 34.4 G/DL (ref 31.5–35.7)
MCV RBC AUTO: 92.5 FL (ref 79–97)
PLATELET # BLD AUTO: 217 10*3/MM3 (ref 140–450)
PMV BLD AUTO: 9.9 FL (ref 6–12)
PROLACTIN SERPL-MCNC: 16.3 NG/ML (ref 4.79–23.3)
RBC # BLD AUTO: 4.52 10*6/MM3 (ref 3.77–5.28)
TESTOST SERPL-MCNC: 28.6 NG/DL (ref 8.4–48.1)
TSH SERPL DL<=0.05 MIU/L-ACNC: 2.37 UIU/ML (ref 0.27–4.2)
WBC NRBC COR # BLD: 8.92 10*3/MM3 (ref 3.4–10.8)

## 2022-05-09 PROCEDURE — 84702 CHORIONIC GONADOTROPIN TEST: CPT

## 2022-05-09 PROCEDURE — 84146 ASSAY OF PROLACTIN: CPT

## 2022-05-09 PROCEDURE — 82670 ASSAY OF TOTAL ESTRADIOL: CPT

## 2022-05-09 PROCEDURE — 84402 ASSAY OF FREE TESTOSTERONE: CPT

## 2022-05-09 PROCEDURE — 84443 ASSAY THYROID STIM HORMONE: CPT

## 2022-05-09 PROCEDURE — 83001 ASSAY OF GONADOTROPIN (FSH): CPT

## 2022-05-09 PROCEDURE — 83002 ASSAY OF GONADOTROPIN (LH): CPT

## 2022-05-09 PROCEDURE — 99213 OFFICE O/P EST LOW 20 MIN: CPT | Performed by: OBSTETRICS & GYNECOLOGY

## 2022-05-09 PROCEDURE — 85027 COMPLETE CBC AUTOMATED: CPT

## 2022-05-09 PROCEDURE — 84403 ASSAY OF TOTAL TESTOSTERONE: CPT

## 2022-05-09 NOTE — PROGRESS NOTES
"Subjective   Chief Complaint   Patient presents with   • Follow-up     Has not had a cycle since December     Val Bloom is a 42 y.o. year old .  Patient's last menstrual period was 2021 (within days).  She presents to be seen because of issues with menstrual cycles. Her last menstrual cycle was the first week of December.  Patient was seen around this time last year for similar complaints and had an overall normal work-up but was lost to follow-up.  Patient reports prior to her menstrual cycle during the week of December from the first to the  she had a period in October from the fourth to the 17th and then in April from the seventh to the 12th.  Patient does admit to recent weight gain within the past year or 2.  Patient reports prior to this starting about a year ago she had overall regular predictable monthly cycles. She reports never being sexually active.     OTHER THINGS SHE WANTS TO DISCUSS TODAY:  Nothing else    The following portions of the patient's history were reviewed and updated as appropriate:current medications, allergies, past family history, past medical history, past social history and past surgical history    Social History    Tobacco Use      Smoking status: Never Smoker      Smokeless tobacco: Never Used    Review of Systems  Constitutional POS: nothing reported    NEG: anorexia or night sweats   Genitourinary POS: nothing reported    NEG: dysuria or hematuria   Gastointestinal POS: nothing reported    NEG: bloating, change in bowel habits, melena or reflux symptoms   Integument POS: nothing reported    NEG: moles that are changing in size, shape, color or rashes   Breast POS: nothing reported    NEG: persistent breast lump, skin dimpling or nipple discharge         Objective   /70 (BP Location: Right arm, Patient Position: Sitting, Cuff Size: Large Adult)   Ht 157.5 cm (62\")   Wt 91.8 kg (202 lb 6.4 oz)   LMP 2021 (Within Days)   Breastfeeding No   BMI " 37.02 kg/m²     General:  well developed; well nourished  no acute distress   Skin:  Not performed.   Thyroid: not examined   Lungs:  breathing is unlabored   Heart:  Not performed.   Breasts:  Not performed.   Abdomen: soft, non-tender; no masses  no umbilical or inguinal hernias are present  no hepato-splenomegaly   Pelvis: Clinical staff was present for exam  External genitalia:  normal appearance of the external genitalia including Bartholin's and [place]'s glands.  :  urethral meatus normal;  Vaginal:  normal pink mucosa without prolapse or lesions.  Cervix:  normal appearance. There appears to be potentially a cervical polyp present but even with yellow smaller pediatric speculum patient is very intolerant to exam and exam was very limited.  Uterus:  normal size, shape and consistency.  Adnexa:  normal bimanual exam of the adnexa.  Rectal:  digital rectal exam not performed; anus visually normal appearing.     Lab Review   AUB labs from last year   PAP from last year - normal co-test    Imaging   Pelvic ultrasound report        Assessment   1. AUB suspect anovulatory cycles     Plan   The following tests were ordered today: see below listed labs.  It was explained to Val that all lab test should be back within the one week after they are performed. She will be notified about the results, regardless of the findings. If she has not been contacted by the office within 2 weeks after the test has been performed, it is her responsibility to contact us to learn about her results.  Recommend we repeat labs from last year and reassess with pelvic ultrasound.  Reviewed anovulatory cycles as my concern for issues with menstrual bleeding and reviewed hormonal contraceptive options including progesterone only pills Depo-Provera levonorgestrel grater device and patient desires to think about this.  In addition I reviewed the concern for possible cervical polyp and recommendation at next exam to take a look and  attentionally remove if still present.  Patient verbalized understanding.  The importance of keeping all planned follow-up and taking all medications as prescribed was emphasized.  Follow up for recheck of above with ultrasound, review labs and repeat pelvic exam if able to assess for possible cervical polyp removal?    No orders of the defined types were placed in this encounter.        Orders Placed This Encounter   Procedures   • US Non-ob Transvaginal     Standing Status:   Future     Standing Expiration Date:   5/9/2023     Order Specific Question:   Reason for Exam:     Answer:   irregular menses   • TSH Rfx On Abnormal To Free T4     Standing Status:   Future     Number of Occurrences:   1     Standing Expiration Date:   5/9/2023     Order Specific Question:   Release to patient     Answer:   Immediate   • CBC (No Diff)     Standing Status:   Future     Number of Occurrences:   1     Standing Expiration Date:   5/9/2023     Order Specific Question:   Release to patient     Answer:   Immediate   • hCG, Quantitative, Pregnancy     Standing Status:   Future     Number of Occurrences:   1     Standing Expiration Date:   5/9/2023     Order Specific Question:   Release to patient     Answer:   Immediate   • Prolactin     Standing Status:   Future     Number of Occurrences:   1     Standing Expiration Date:   5/9/2023     Order Specific Question:   Release to patient     Answer:   Immediate   • Testosterone Free Direct     Standing Status:   Future     Number of Occurrences:   1     Standing Expiration Date:   5/9/2023     Order Specific Question:   Release to patient     Answer:   Immediate   • Testosterone     Standing Status:   Future     Number of Occurrences:   1     Standing Expiration Date:   5/9/2023     Order Specific Question:   Release to patient     Answer:   Immediate   • Follicle Stimulating Hormone     Standing Status:   Future     Number of Occurrences:   1     Standing Expiration Date:   5/9/2023      Order Specific Question:   Release to patient     Answer:   Immediate   • Luteinizing Hormone     Standing Status:   Future     Number of Occurrences:   1     Standing Expiration Date:   5/9/2023     Order Specific Question:   Release to patient     Answer:   Immediate   • Estradiol     Standing Status:   Future     Number of Occurrences:   1     Standing Expiration Date:   5/9/2023     Order Specific Question:   Release to patient     Answer:   Immediate         This note was electronically signed.    Latonia Gracia MD  May 9, 2022

## 2022-05-10 RX ORDER — MEDROXYPROGESTERONE ACETATE 10 MG/1
10 TABLET ORAL DAILY
Qty: 10 TABLET | Refills: 0 | Status: SHIPPED | OUTPATIENT
Start: 2022-05-10 | End: 2022-05-19

## 2022-05-11 ENCOUNTER — TELEPHONE (OUTPATIENT)
Dept: OBSTETRICS AND GYNECOLOGY | Facility: CLINIC | Age: 42
End: 2022-05-11

## 2022-05-11 LAB — TESTOST FREE SERPL-MCNC: 4.5 PG/ML (ref 0–4.2)

## 2022-05-11 NOTE — TELEPHONE ENCOUNTER
Dr. Gracia's patient returned my call. Patient notified of her labs, that a prescription for provera was sent to her pharmacy and the need for an ultrasound and appointment with Dr. Gracia after her ultrasound. Patient states she has already scheduled an appointment with Dr. Gracia for 7/5/2022 with an ultrasound prior to her appointment with Dr. Gracia.

## 2022-05-19 ENCOUNTER — HOSPITAL ENCOUNTER (OUTPATIENT)
Dept: MAMMOGRAPHY | Facility: HOSPITAL | Age: 42
Discharge: HOME | End: 2022-05-19
Admitting: NURSE PRACTITIONER

## 2022-05-19 DIAGNOSIS — Z12.31 ENCOUNTER FOR SCREENING MAMMOGRAM FOR BREAST CANCER: ICD-10-CM

## 2022-05-19 PROCEDURE — 77067 SCR MAMMO BI INCL CAD: CPT

## 2022-05-19 PROCEDURE — 77067 SCR MAMMO BI INCL CAD: CPT | Performed by: RADIOLOGY

## 2022-05-19 PROCEDURE — 77063 BREAST TOMOSYNTHESIS BI: CPT | Performed by: RADIOLOGY

## 2022-05-19 PROCEDURE — 77063 BREAST TOMOSYNTHESIS BI: CPT

## 2022-05-19 RX ORDER — MEDROXYPROGESTERONE ACETATE 10 MG/1
10 TABLET ORAL DAILY
Qty: 10 TABLET | Refills: 0 | Status: SHIPPED | OUTPATIENT
Start: 2022-05-19 | End: 2022-05-29

## 2022-05-31 RX ORDER — MEDROXYPROGESTERONE ACETATE 10 MG/1
10 TABLET ORAL DAILY
Qty: 10 TABLET | Refills: 0 | OUTPATIENT
Start: 2022-05-31 | End: 2022-06-10

## 2022-07-05 ENCOUNTER — OFFICE VISIT (OUTPATIENT)
Dept: OBSTETRICS AND GYNECOLOGY | Facility: CLINIC | Age: 42
End: 2022-07-05

## 2022-07-05 VITALS
SYSTOLIC BLOOD PRESSURE: 120 MMHG | BODY MASS INDEX: 38.28 KG/M2 | WEIGHT: 208 LBS | HEIGHT: 62 IN | DIASTOLIC BLOOD PRESSURE: 88 MMHG

## 2022-07-05 DIAGNOSIS — E28.2 PCOS (POLYCYSTIC OVARIAN SYNDROME): ICD-10-CM

## 2022-07-05 DIAGNOSIS — N93.9 ABNORMAL UTERINE BLEEDING (AUB): Primary | ICD-10-CM

## 2022-07-05 PROCEDURE — 99213 OFFICE O/P EST LOW 20 MIN: CPT | Performed by: OBSTETRICS & GYNECOLOGY

## 2022-07-05 RX ORDER — MEDROXYPROGESTERONE ACETATE 10 MG/1
10 TABLET ORAL DAILY
Qty: 10 TABLET | Refills: 11 | Status: SHIPPED | OUTPATIENT
Start: 2022-07-05 | End: 2022-07-15

## 2022-07-05 NOTE — PROGRESS NOTES
"Subjective   Chief Complaint   Patient presents with   • Follow-up     AUB / US was not able to be done today     Val Bloom is a 42 y.o. year old .  Patient's last menstrual period was 2022 (exact date).She presents to be seen because of follow up of anovulatory cycles. She reports after taking the provera she had a menstrual cycle from  to 22. She was supposed to have a pelvic ultrasound today but she could not tolerate a vaginal probe and there was difficulty with getting abdominal images. She reports not being sexually active.       OTHER THINGS SHE WANTS TO DISCUSS TODAY:  Nothing else    The following portions of the patient's history were reviewed and updated as appropriate:current medications and allergies    Social History    Tobacco Use      Smoking status: Never Smoker      Smokeless tobacco: Never Used    Review of Systems  Constitutional POS: nothing reported    NEG: anorexia or night sweats   Genitourinary POS: nothing reported    NEG: dysuria or hematuria   Gastointestinal POS: nothing reported    NEG: bloating, change in bowel habits, melena or reflux symptoms   Integument POS: nothing reported    NEG: moles that are changing in size, shape, color or rashes   Breast POS: nothing reported    NEG: persistent breast lump, skin dimpling or nipple discharge         Objective   /88 (BP Location: Right arm, Patient Position: Sitting, Cuff Size: Large Adult)   Ht 157.5 cm (62\")   Wt 94.3 kg (208 lb)   LMP 2022 (Exact Date)   Breastfeeding No   BMI 38.04 kg/m²     General:  well developed; well nourished  no acute distress   Skin:  Not performed.   Thyroid: not examined   Lungs:  breathing is unlabored   Heart:  Not performed.   Breasts:  Not performed.   Abdomen: Not performed.   Pelvis: Not performed.     Lab Review   AUB labs from last visit and PAP    Imaging   No data reviewed        Assessment   1. AUB - anovulatory cycles most consistent with PCOS given " oligomenorrhea and elevated testosterone      Plan   1. Reviewed utility of monthly provera versus progesterone only contraception as stated previously. She desires to proceed with monthly provera withdrawal. Prescription sent.   2. Reviewed utility of diet and exercise in regards to insulin resistance.   3. The following tests were ordered today: fasting lipid panel and A1c and DHEAS and 17OHP.  It was explained to Val that all lab test should be back within the one week after they are performed. She will be notified about the results, regardless of the findings. If she has not been contacted by the office within 2 weeks after the test has been performed, it is her responsibility to contact us to learn about her results.  4. Reviewed concern for possible cervical polyp during last exam but exam severely limited by patient discomfort. Reviewed if she has any increased frequency of bleeding would need to evaluate and would overall recommend removal to have pathology but patient declines.   5. The importance of keeping all planned follow-up and taking all medications as prescribed was emphasized.  6. Follow up for recheck of above in ~ 3 months.     New Medications Ordered This Visit   Medications   • medroxyPROGESTERone (Provera) 10 MG tablet     Sig: Take 1 tablet by mouth Daily for 10 days.     Dispense:  10 tablet     Refill:  11          This note was electronically signed.    Latonia Gracia MD  July 5, 2022

## 2022-10-05 ENCOUNTER — LAB (OUTPATIENT)
Dept: LAB | Facility: HOSPITAL | Age: 42
End: 2022-10-05

## 2022-10-05 ENCOUNTER — OFFICE VISIT (OUTPATIENT)
Dept: INTERNAL MEDICINE | Facility: CLINIC | Age: 42
End: 2022-10-05

## 2022-10-05 VITALS
TEMPERATURE: 97 F | HEART RATE: 107 BPM | DIASTOLIC BLOOD PRESSURE: 78 MMHG | WEIGHT: 201 LBS | OXYGEN SATURATION: 98 % | HEIGHT: 62 IN | SYSTOLIC BLOOD PRESSURE: 126 MMHG | BODY MASS INDEX: 36.99 KG/M2

## 2022-10-05 DIAGNOSIS — Z13.1 SCREENING FOR DIABETES MELLITUS: ICD-10-CM

## 2022-10-05 DIAGNOSIS — Z13.21 ENCOUNTER FOR VITAMIN DEFICIENCY SCREENING: ICD-10-CM

## 2022-10-05 DIAGNOSIS — Z13.220 LIPID SCREENING: ICD-10-CM

## 2022-10-05 DIAGNOSIS — Z00.00 WELLNESS EXAMINATION: Primary | ICD-10-CM

## 2022-10-05 DIAGNOSIS — E55.9 VITAMIN D DEFICIENCY: ICD-10-CM

## 2022-10-05 DIAGNOSIS — G43.009 MIGRAINE WITHOUT AURA AND WITHOUT STATUS MIGRAINOSUS, NOT INTRACTABLE: ICD-10-CM

## 2022-10-05 DIAGNOSIS — G40.909 SEIZURE DISORDER: ICD-10-CM

## 2022-10-05 DIAGNOSIS — Z13.29 THYROID DISORDER SCREENING: ICD-10-CM

## 2022-10-05 DIAGNOSIS — E28.2 PCOS (POLYCYSTIC OVARIAN SYNDROME): ICD-10-CM

## 2022-10-05 DIAGNOSIS — Z13.0 SCREENING FOR BLOOD DISEASE: ICD-10-CM

## 2022-10-05 DIAGNOSIS — Z11.59 ENCOUNTER FOR HEPATITIS C SCREENING TEST FOR LOW RISK PATIENT: ICD-10-CM

## 2022-10-05 LAB
DEPRECATED RDW RBC AUTO: 41.9 FL (ref 37–54)
ERYTHROCYTE [DISTWIDTH] IN BLOOD BY AUTOMATED COUNT: 12.6 % (ref 12.3–15.4)
HCT VFR BLD AUTO: 42.2 % (ref 34–46.6)
HGB BLD-MCNC: 14.8 G/DL (ref 12–15.9)
MCH RBC QN AUTO: 31.9 PG (ref 26.6–33)
MCHC RBC AUTO-ENTMCNC: 35.1 G/DL (ref 31.5–35.7)
MCV RBC AUTO: 90.9 FL (ref 79–97)
PLATELET # BLD AUTO: 209 10*3/MM3 (ref 140–450)
PMV BLD AUTO: 9.7 FL (ref 6–12)
RBC # BLD AUTO: 4.64 10*6/MM3 (ref 3.77–5.28)
WBC NRBC COR # BLD: 7.09 10*3/MM3 (ref 3.4–10.8)

## 2022-10-05 PROCEDURE — 80061 LIPID PANEL: CPT | Performed by: NURSE PRACTITIONER

## 2022-10-05 PROCEDURE — 80050 GENERAL HEALTH PANEL: CPT | Performed by: NURSE PRACTITIONER

## 2022-10-05 PROCEDURE — 82627 DEHYDROEPIANDROSTERONE: CPT | Performed by: OBSTETRICS & GYNECOLOGY

## 2022-10-05 PROCEDURE — 83498 ASY HYDROXYPROGESTERONE 17-D: CPT | Performed by: OBSTETRICS & GYNECOLOGY

## 2022-10-05 PROCEDURE — 99396 PREV VISIT EST AGE 40-64: CPT | Performed by: NURSE PRACTITIONER

## 2022-10-05 PROCEDURE — 83036 HEMOGLOBIN GLYCOSYLATED A1C: CPT | Performed by: NURSE PRACTITIONER

## 2022-10-05 NOTE — PROGRESS NOTES
Subjective   Chief Complaint   Patient presents with   • Annual Exam       Val Bloom is a 42 y.o. female here today for annual exam. Patient is doing well overall. No seizure episodes. Continues to take keppra daily. This does not make her feel tired or fatigued. No recent migraine headaches. Not had to take imitrex. Follows up with neurology on this. She would like paperwork filled out to foster. No exposure to TB or communicable diseases. No shortness of breath or chest pain. Has been seen by gynecology for PCOS and needs to get hormone labs completed for them today.     Overall healthy:  Yes  Regular exercise:  No  Diet is well balanced:  No and Improving  Vitamin Supplement:  No  Alcohol intake:  Yes and Mild  Tobacco use:  No  Cardiovascular risk is low:  Low  Menstrual cycle regular:  No and seeing gynecology  PAP:  Up to date and Normal  :  No urinary issues  Mammogram:  Up to date and FH, aunts  Colonoscopy:  N/A and No FH  GI:  Normal BM  Regular dental exam:  Up to date  Regular eye exam:  Due and Will self-schedule  Immunizations up to date:  Up to date  Wear a seatbelt regularly:  Yes  Wear sunscreen regularly when outdoors:  Yes    Review of Systems   Constitutional: Negative for activity change, appetite change, chills, diaphoresis, fatigue, fever, unexpected weight gain and unexpected weight loss.   HENT: Negative for ear discharge, ear pain, mouth sores, nosebleeds, sinus pressure, sneezing and sore throat.    Eyes: Negative for pain, discharge and itching.   Respiratory: Negative for cough, chest tightness, shortness of breath and wheezing.    Cardiovascular: Negative for chest pain, palpitations and leg swelling.   Gastrointestinal: Negative for abdominal pain, constipation, diarrhea, nausea and vomiting.   Endocrine: Negative for heat intolerance, polydipsia and polyphagia.   Genitourinary: Negative for dysuria, flank pain, frequency, hematuria and urgency.   Musculoskeletal: Negative for  arthralgias, back pain, gait problem, joint swelling, myalgias, neck pain and neck stiffness.   Skin: Negative for color change, pallor and rash.   Allergic/Immunologic: Negative for immunocompromised state.   Neurological: Negative for seizures, speech difficulty, weakness and numbness.   Hematological: Negative for adenopathy.   Psychiatric/Behavioral: Negative for agitation, decreased concentration, sleep disturbance, suicidal ideas and depressed mood. The patient is not nervous/anxious.        Past Medical History:   Diagnosis Date   • Aspiration pneumonia (HCC) 09/15/2016   • Chronic left-sided low back pain with left-sided sciatica    • Migraine without aura    • Seizures (HCC)     first grade initial and then 2nd in 2015     Past Surgical History:   Procedure Laterality Date   • NO PAST SURGERIES       Family History   Problem Relation Age of Onset   • Alcohol abuse Mother    • Alzheimer's disease Mother    • Stroke Mother    • Seizures Mother         Epilepsy   • Hypertension Mother    • Cancer Mother         History of malignant neoplasm   • Migraines Mother    • Breast cancer Paternal Aunt    • Ovarian cancer Neg Hx    • Uterine cancer Neg Hx    • Colon cancer Neg Hx    • Osteoporosis Neg Hx      Social History     Tobacco Use   Smoking Status Never Smoker   Smokeless Tobacco Never Used      Social History     Substance and Sexual Activity   Alcohol Use Not Currently    Comment: Occasional      No Known Allergies    Current Outpatient Medications on File Prior to Visit   Medication Sig   • folic acid (FOLVITE) 1 MG tablet Take 2 tablets by mouth Daily.   • levETIRAcetam XR (KEPPRA XR) 500 MG 24 hr tablet Take 1 tablet by mouth Daily.   • SUMAtriptan (Imitrex) 50 MG tablet Take one tablet at onset of headache. May repeat dose one time in 2 hours if headache not relieved.     No current facility-administered medications on file prior to visit.       Objective   Vitals:    10/05/22 1138   BP: 126/78   Pulse:  "107   Temp: 97 °F (36.1 °C)   TempSrc: Temporal   SpO2: 98%   Weight: 91.2 kg (201 lb)   Height: 157.5 cm (62\")     Body mass index is 36.76 kg/m².    Physical Exam  Vitals reviewed.   Constitutional:       Appearance: Normal appearance. She is well-developed.   HENT:      Head: Normocephalic and atraumatic.      Right Ear: Hearing, tympanic membrane, ear canal and external ear normal.      Left Ear: Hearing, tympanic membrane, ear canal and external ear normal.      Nose: Nose normal.      Mouth/Throat:      Lips: Pink.      Mouth: Mucous membranes are moist.      Pharynx: Oropharynx is clear. Uvula midline.   Eyes:      General: Lids are normal.      Extraocular Movements: Extraocular movements intact.      Conjunctiva/sclera: Conjunctivae normal.      Pupils: Pupils are equal, round, and reactive to light.   Neck:      Thyroid: No thyromegaly.      Trachea: Trachea normal.   Cardiovascular:      Rate and Rhythm: Normal rate and regular rhythm.      Pulses:           Carotid pulses are 2+ on the right side and 2+ on the left side.     Heart sounds: Normal heart sounds.   Pulmonary:      Effort: Pulmonary effort is normal. No respiratory distress.      Breath sounds: Normal breath sounds.   Abdominal:      General: Bowel sounds are normal.      Palpations: Abdomen is soft.      Tenderness: There is no abdominal tenderness.   Musculoskeletal:      Comments: Normal range of motion of all major joints   Skin:     General: Skin is warm and dry.      Capillary Refill: Capillary refill takes 2 to 3 seconds.   Neurological:      Mental Status: She is alert and oriented to person, place, and time.      GCS: GCS eye subscore is 4. GCS verbal subscore is 5. GCS motor subscore is 6.   Psychiatric:         Attention and Perception: Attention normal.         Mood and Affect: Mood normal.         Speech: Speech normal.         Behavior: Behavior normal. Behavior is cooperative.         Thought Content: Thought content normal. "         Class 2 Severe Obesity (BMI >=35 and <=39.9). Obesity-related health conditions include the following: none. Obesity is improving with lifestyle modifications. BMI is is above average; BMI management plan is completed. We discussed portion control and increasing exercise.        Assessment & Plan     Current Outpatient Medications:   •  folic acid (FOLVITE) 1 MG tablet, Take 2 tablets by mouth Daily., Disp: 180 tablet, Rfl: 3  •  levETIRAcetam XR (KEPPRA XR) 500 MG 24 hr tablet, Take 1 tablet by mouth Daily., Disp: 90 tablet, Rfl: 3  •  SUMAtriptan (Imitrex) 50 MG tablet, Take one tablet at onset of headache. May repeat dose one time in 2 hours if headache not relieved., Disp: 27 tablet, Rfl: 3    Problem List Items Addressed This Visit        Neuro    Seizure disorder (HCC)  Chronic stable. Continue keppra and follow ups with neurology      Migraine without aura  Chronic stable. Continue imitrex and follow ups with neurology.       Other Visit Diagnoses     Wellness examination    -  Primary  Patient will continue to eat a well-balanced diet, drink adequate amount of water, exercise at least 3 times weekly, and get adequate rest.  Suggested a multivitamin daily.  Discussed future preventative screenings and immunizations. [facility] paperwork completed for her today.       Relevant Orders    CBC (No Diff)    Comprehensive Metabolic Panel    Lipid Panel    Hemoglobin A1c    TSH Rfx On Abnormal To Free T4    Vitamin B12 & Folate    Vitamin D 25 Hydroxy    Hepatitis C Antibody    PCOS (polycystic ovarian syndrome)      Chronic stable. Completing hormone labs ordered by gynecology today. Continue follow ups with gynecology.       Screening for blood disease        Relevant Orders    CBC (No Diff)    Comprehensive Metabolic Panel    Lipid Panel    Hemoglobin A1c    TSH Rfx On Abnormal To Free T4    Vitamin B12 & Folate    Vitamin D 25 Hydroxy    Hepatitis C Antibody    Thyroid disorder screening        Relevant  Orders    TSH Rfx On Abnormal To Free T4    Lipid screening        Relevant Orders    Lipid Panel    Encounter for vitamin deficiency screening        Relevant Orders    Vitamin B12 & Folate    Vitamin D 25 Hydroxy    Screening for diabetes mellitus        Relevant Orders    Hemoglobin A1c    Encounter for hepatitis C screening test for low risk patient        Relevant Orders    Hepatitis C Antibody    Vitamin D deficiency        Relevant Orders    Vitamin D 25 Hydroxy               Counseling was given to patient for the following topics: appropriate exercise, healthy eating habits, disease prevention, risk factors for cancer, importance of self breast exam and breast health, importance of immunizations, including risks and benefits, bone health, sun safety and seatbelt use.      Plan of care reviewed with the patient at the conclusion of today's visit.  Education was provided regarding diagnosis, management, and any prescribed or recommended OTC medications.  Patient verbalized understanding of and agreement with management plan.     Return in about 1 month (around 11/5/2022), or if symptoms worsen or fail to improve.      JIMENEZ Rios    Patient has given verbal consent to use BILL to record this visit today.

## 2022-10-06 LAB
ALBUMIN SERPL-MCNC: 4.6 G/DL (ref 3.5–5.2)
ALBUMIN/GLOB SERPL: 1.9 G/DL
ALP SERPL-CCNC: 63 U/L (ref 39–117)
ALT SERPL W P-5'-P-CCNC: 19 U/L (ref 1–33)
ANION GAP SERPL CALCULATED.3IONS-SCNC: 11.7 MMOL/L (ref 5–15)
AST SERPL-CCNC: 20 U/L (ref 1–32)
BILIRUB SERPL-MCNC: 0.2 MG/DL (ref 0–1.2)
BUN SERPL-MCNC: 13 MG/DL (ref 6–20)
BUN/CREAT SERPL: 18.1 (ref 7–25)
CALCIUM SPEC-SCNC: 9.4 MG/DL (ref 8.6–10.5)
CHLORIDE SERPL-SCNC: 103 MMOL/L (ref 98–107)
CHOLEST SERPL-MCNC: 185 MG/DL (ref 0–200)
CO2 SERPL-SCNC: 23.3 MMOL/L (ref 22–29)
CREAT SERPL-MCNC: 0.72 MG/DL (ref 0.57–1)
DHEA-S SERPL-MCNC: 233 UG/DL (ref 57.3–279.2)
EGFRCR SERPLBLD CKD-EPI 2021: 108.6 ML/MIN/1.73
GLOBULIN UR ELPH-MCNC: 2.4 GM/DL
GLUCOSE SERPL-MCNC: 86 MG/DL (ref 65–99)
HBA1C MFR BLD: 5.3 % (ref 4.8–5.6)
HDLC SERPL-MCNC: 36 MG/DL (ref 40–60)
LDLC SERPL CALC-MCNC: 123 MG/DL (ref 0–100)
LDLC/HDLC SERPL: 3.33 {RATIO}
POTASSIUM SERPL-SCNC: 4.2 MMOL/L (ref 3.5–5.2)
PROT SERPL-MCNC: 7 G/DL (ref 6–8.5)
SODIUM SERPL-SCNC: 138 MMOL/L (ref 136–145)
TRIGL SERPL-MCNC: 145 MG/DL (ref 0–150)
TSH SERPL DL<=0.05 MIU/L-ACNC: 1.93 UIU/ML (ref 0.27–4.2)
VLDLC SERPL-MCNC: 26 MG/DL (ref 5–40)

## 2022-10-09 NOTE — PROGRESS NOTES
Subjective   Chief Complaint   Patient presents with   • Follow-up     AUB re-check / seems to have improved     Val Bloom is a 42 y.o. year old .  Patient's last menstrual period was 2022 (exact date).  She presents to be seen because of follow up of abnormal uterine bleeding with history of oligomenorrhea and history and lab values consistent with PCOS. She previously had an ultrasound scheduled last visit but could not tolerate a vaginal probe and abdominal images were insufficient. Also, at the time of her last exam there was what seemed to be a cervical polyp but she was unable to tolerate the exam for further assessment or potential removal. She has been taking provera each month which has inducted a monthly menses for her and she is very happy with this medication.     She finished provera pills Thursday.  LMP was 22  No intermenstrual bleeding  She desires to continue the medication     OTHER THINGS SHE WANTS TO DISCUSS TODAY:  Nothing else    The following portions of the patient's history were reviewed and updated as appropriate:current medications and allergies    Social History    Tobacco Use      Smoking status: Never      Smokeless tobacco: Never    Review of Systems  Constitutional POS: nothing reported    NEG: anorexia or night sweats   Genitourinary POS: nothing reported    NEG: dysuria or hematuria   Gastointestinal POS: nothing reported    NEG: bloating, change in bowel habits, melena or reflux symptoms   Integument POS: nothing reported    NEG: moles that are changing in size, shape, color or rashes   Breast POS: nothing reported    NEG: persistent breast lump, skin dimpling or nipple discharge         Objective   /80 (BP Location: Right arm, Patient Position: Sitting, Cuff Size: Large Adult)   Wt 92.3 kg (203 lb 6.4 oz)   LMP 2022 (Exact Date)   BMI 37.20 kg/m²     General:  well developed; well nourished  no acute distress   Skin:  Not performed.    Thyroid: not examined   Lungs:  breathing is unlabored   Heart:  Not performed.   Breasts:  Not performed.   Abdomen: Not performed.   Pelvis: Not performed.     Lab Review   PAP, recent PCP labs    Imaging   No data reviewed        Assessment   1. PCOS     Plan   1. She desires to continue provera pills monthly. Reviewed these are not contraceptive pills. She knows to reach out if she desires contraception  2. Reviewed the importance of continued q1-2 years follow of up of A1c and lipid panel and good diet and nutrition.   3. Reviewed given history of possible cervical polyp she needs to reach out with any AUB. She verbalized understanding.   4. The importance of keeping all planned follow-up and taking all medications as prescribed was emphasized.  5. Follow up for annual exam in ~ 4-5 months    No orders of the defined types were placed in this encounter.         This note was electronically signed.    Latonia Gracia MD  October 10, 2022

## 2022-10-10 ENCOUNTER — OFFICE VISIT (OUTPATIENT)
Dept: OBSTETRICS AND GYNECOLOGY | Facility: CLINIC | Age: 42
End: 2022-10-10

## 2022-10-10 VITALS — SYSTOLIC BLOOD PRESSURE: 110 MMHG | DIASTOLIC BLOOD PRESSURE: 80 MMHG | WEIGHT: 203.4 LBS | BODY MASS INDEX: 37.2 KG/M2

## 2022-10-10 DIAGNOSIS — N93.9 ABNORMAL UTERINE BLEEDING (AUB): Primary | ICD-10-CM

## 2022-10-10 DIAGNOSIS — E28.2 PCOS (POLYCYSTIC OVARIAN SYNDROME): ICD-10-CM

## 2022-10-10 LAB — 17OHP SERPL-MCNC: 20 NG/DL

## 2022-10-10 PROCEDURE — 99213 OFFICE O/P EST LOW 20 MIN: CPT | Performed by: OBSTETRICS & GYNECOLOGY

## 2022-10-10 RX ORDER — MEDROXYPROGESTERONE ACETATE 10 MG/1
10 TABLET ORAL DAILY
COMMUNITY
Start: 2022-10-06 | End: 2022-10-10

## 2022-10-10 RX ORDER — MEDROXYPROGESTERONE ACETATE 10 MG/1
10 TABLET ORAL DAILY
Qty: 10 TABLET | Refills: 5 | Status: SHIPPED | OUTPATIENT
Start: 2022-10-10 | End: 2023-02-09 | Stop reason: SDUPTHER

## 2023-01-13 ENCOUNTER — OFFICE VISIT (OUTPATIENT)
Dept: NEUROLOGY | Facility: CLINIC | Age: 43
End: 2023-01-13
Payer: COMMERCIAL

## 2023-01-13 VITALS
SYSTOLIC BLOOD PRESSURE: 118 MMHG | OXYGEN SATURATION: 98 % | HEART RATE: 108 BPM | WEIGHT: 200 LBS | DIASTOLIC BLOOD PRESSURE: 70 MMHG | BODY MASS INDEX: 36.8 KG/M2 | HEIGHT: 62 IN

## 2023-01-13 DIAGNOSIS — G43.009 MIGRAINE WITHOUT AURA AND WITHOUT STATUS MIGRAINOSUS, NOT INTRACTABLE: ICD-10-CM

## 2023-01-13 DIAGNOSIS — G40.909 SEIZURE DISORDER: Primary | ICD-10-CM

## 2023-01-13 PROCEDURE — 99213 OFFICE O/P EST LOW 20 MIN: CPT | Performed by: NURSE PRACTITIONER

## 2023-01-13 RX ORDER — FOLIC ACID 1 MG/1
2 TABLET ORAL DAILY
Qty: 180 TABLET | Refills: 4 | Status: SHIPPED | OUTPATIENT
Start: 2023-01-13 | End: 2023-03-31

## 2023-01-13 RX ORDER — SUMATRIPTAN 50 MG/1
TABLET, FILM COATED ORAL
Qty: 27 TABLET | Refills: 4 | Status: SHIPPED | OUTPATIENT
Start: 2023-01-13

## 2023-01-13 RX ORDER — LEVETIRACETAM 500 MG/1
500 TABLET, EXTENDED RELEASE ORAL DAILY
Qty: 90 TABLET | Refills: 4 | Status: SHIPPED | OUTPATIENT
Start: 2023-01-13 | End: 2023-02-01

## 2023-01-13 NOTE — PROGRESS NOTES
Subjective:     Patient ID: Val Bloom is a 42 y.o. female.    CC:   Chief Complaint   Patient presents with   • Seizures       HPI:   History of Present Illness   Today 1/13/2023-  This is a 42-year-old female who presents for 1 year neurology follow-up on seizure disorder and migraines. She had 2 lifetime seizure episodes, once in first grade and then once in 2015. She has been well controlled on levetiracetam  mg daily. She also takes folic acid 2 mg daily. She does eventually plan to have children. She had an EEG in 2016, which was normal. Previous MRI of the brain in 2016 showed several small bifrontal gliotic foci felt to be related to migraines. Migraines have generally been just a few times per month. Her mother also has epilepsy and migraines. The seizure she had as a child was a generalized convulsive seizure. The second seizure she had was witnessed by her  and was in the night. She would not respond to him and was unable to speak and was staring. Previously followed in clinic long term by Dr. Adrián Pringle, neurology. She is here today for follow-up and refills on medications. We prescribed sumatriptan for her to take as needed for migraines at last visit.    She states she is doing well overall. She denies any changes in her health since her last visit.     She has not tried the sumatriptan yet because she has taken 1 Excedrin. She states if the Excedrin does not work then she would have taken sumatriptan. She denies any recurrent seizures. She denies any migraines.     She reports that she had a rough day at work 1 year ago. After work, she went home and had a small glass of wine. She did not see her 's shoes and tripped over and landed on the glass. She had to receive stitches.     She recently had a regular gynecologist checkup. She has been having irregular menstrual cycles since 2020. She is still taking folic acid and medroxyprogesterone. She denies any pregnancy.      The following portions of the patient's history were reviewed and updated as appropriate: allergies, current medications, past family history, past medical history, past social history, past surgical history and problem list.    Past Medical History:   Diagnosis Date   • Aspiration pneumonia (HCC) 09/15/2016   • Chronic left-sided low back pain with left-sided sciatica    • Migraine without aura    • Seizures (HCC)     first grade initial and then 2nd in 2015       Past Surgical History:   Procedure Laterality Date   • NO PAST SURGERIES         Social History     Socioeconomic History   • Marital status:    Tobacco Use   • Smoking status: Never   • Smokeless tobacco: Never   Vaping Use   • Vaping Use: Never used   Substance and Sexual Activity   • Alcohol use: Not Currently     Comment: Occasional   • Drug use: No   • Sexual activity: Not Currently     Partners: Male     Birth control/protection: None       Family History   Problem Relation Age of Onset   • Alcohol abuse Mother    • Alzheimer's disease Mother    • Stroke Mother    • Seizures Mother         Epilepsy   • Hypertension Mother    • Cancer Mother         History of malignant neoplasm   • Migraines Mother    • Breast cancer Paternal Aunt    • Dementia Maternal Grandfather         My mom's Dad   • Ovarian cancer Neg Hx    • Uterine cancer Neg Hx    • Colon cancer Neg Hx    • Osteoporosis Neg Hx           Current Outpatient Medications:   •  folic acid (FOLVITE) 1 MG tablet, Take 2 tablets by mouth Daily., Disp: 180 tablet, Rfl: 4  •  levETIRAcetam XR (KEPPRA XR) 500 MG 24 hr tablet, Take 1 tablet by mouth Daily., Disp: 90 tablet, Rfl: 4  •  medroxyPROGESTERone (PROVERA) 10 MG tablet, Take 1 tablet by mouth Daily., Disp: 10 tablet, Rfl: 5  •  SUMAtriptan (Imitrex) 50 MG tablet, Take one tablet at onset of headache. May repeat dose one time in 2 hours if headache not relieved., Disp: 27 tablet, Rfl: 4     Review of Systems   Constitutional:  "Negative for chills, fatigue, fever and unexpected weight change.   HENT: Negative for ear pain, hearing loss, nosebleeds, rhinorrhea and sore throat.    Eyes: Negative for photophobia, pain, discharge, itching and visual disturbance.   Respiratory: Negative for cough, chest tightness, shortness of breath and wheezing.    Cardiovascular: Negative for chest pain, palpitations and leg swelling.   Gastrointestinal: Negative for abdominal pain, blood in stool, constipation, diarrhea, nausea and vomiting.   Genitourinary: Negative for dysuria, frequency, hematuria and urgency.   Musculoskeletal: Negative for arthralgias, back pain, gait problem, joint swelling, myalgias, neck pain and neck stiffness.   Skin: Negative for rash and wound.   Allergic/Immunologic: Negative for environmental allergies and food allergies.   Neurological: Negative for dizziness, tremors, seizures, syncope, speech difficulty, weakness, light-headedness, numbness and headaches.   Hematological: Negative for adenopathy. Does not bruise/bleed easily.   Psychiatric/Behavioral: Negative for agitation, confusion, decreased concentration, hallucinations, sleep disturbance and suicidal ideas. The patient is not nervous/anxious.    All other systems reviewed and are negative.       Objective:  /70   Pulse 108   Ht 157.5 cm (62\")   Wt 90.7 kg (200 lb)   SpO2 98%   BMI 36.58 kg/m²     Neurologic Exam     Mental Status   Oriented to person, place, and time.   Speech: speech is normal   Level of consciousness: alert    Cranial Nerves   Cranial nerves II through XII intact.     Motor Exam   Muscle bulk: normal  Overall muscle tone: normal    Strength   Strength 5/5 throughout.     Gait, Coordination, and Reflexes     Gait  Gait: normal    Coordination   Finger to nose coordination: normal    Tremor   Resting tremor: absent  Intention tremor: absent  Action tremor: absent    Reflexes   Reflexes 2+ except as noted.   Right : 2+  Left : " 2+      Physical Exam  Constitutional:       Appearance: Normal appearance. She is obese.      Comments: BMI 36.6   Neurological:      Mental Status: She is alert and oriented to person, place, and time.      Cranial Nerves: Cranial nerves 2-12 are intact.      Motor: Motor strength is normal.      Coordination: Finger-Nose-Finger Test normal.      Gait: Gait is intact.   Psychiatric:         Mood and Affect: Mood and affect normal.         Speech: Speech normal.         Behavior: Behavior normal.         Thought Content: Thought content normal.         Cognition and Memory: Cognition and memory normal.         Judgment: Judgment normal.     Results:  She had blood work on 10/05/2022 with primary care provider of last year. CBC was normal. Comprehensive metabolic panel was normal. Total cholesterol was normal at 185 mg/dL, triglycerides were good at 145 mg/dL, HDL was a little bit low at 40 mg/dL, and LDL was elevated at 123 mg/dL. Hemoglobin A1c was normal at 5.3 percent. Thyroid stimulated hormone was normal.    Assessment/Plan:       Diagnoses and all orders for this visit:    1. Seizure disorder (HCC) (Primary)  -     folic acid (FOLVITE) 1 MG tablet; Take 2 tablets by mouth Daily.  Dispense: 180 tablet; Refill: 4  -     levETIRAcetam XR (KEPPRA XR) 500 MG 24 hr tablet; Take 1 tablet by mouth Daily.  Dispense: 90 tablet; Refill: 4    2. Migraine without aura and without status migrainosus, not intractable  -     SUMAtriptan (Imitrex) 50 MG tablet; Take one tablet at onset of headache. May repeat dose one time in 2 hours if headache not relieved.  Dispense: 27 tablet; Refill: 4           She is going to continue her current medications.    She does follow with her gynecologist provider for abnormal menstrual cycles and suspicion of PCOS. She does get regular labs with her PCP, which we reviewed today.    We will follow up in 1 year or sooner if needed.    Reviewed medications, potential side effects and signs and  "symptoms to report. Discussed risk versus benefits of treatment plan with patient and/or family-including medications, labs and radiology that may be ordered. Addressed questions and concerns during visit. Patient and/or family verbalized understanding and agree with plan.    AS THE PROVIDER, I PERSONALLY WORE PPE DURING ENTIRE FACE TO FACE ENCOUNTER IN CLINIC WITH THE PATIENT. PATIENT ALSO WORE PPE DURING ENTIRE FACE TO FACE ENCOUNTER EXCEPT FOR A MAX OF 30 SECONDS DURING NEUROLOGICAL EVALUATION OF CRANIAL NERVES AND THEN MASK WAS PLACED BACK OVER PATIENT FACE FOR REMAINDER OF VISIT. I WASHED MY HANDS BEFORE AND AFTER VISIT.    Patient instructions include: No driving or operating heavy machinery, solo bathing or tub baths for 90 days from onset of most recent seizure, if they currently hold a license. Minimize stress as much as possible. Recommended 7-8 hours of sleep each night. Abstain from alcohol intake. Educated on Antiepileptic medications with possible side effects and signs and symptoms to report if prescribed during visit. Instructed to take seizure medication daily if prescribed. Reviewed potential seizure risk factors. Instructed to call 911 or our office if another seizure does occur.    Sudden Unexpected Death in Epilepsy is defined as \"the sudden, unexpected, witnessed or unwitnessed, non-traumatic, and non-drowning death in patients with epilepsy with or without evidence for a seizure, and excluding documented status epilepticus, in which postmortem examination does not reveal a structural or toxicological cause for death.\"-Epilepsy Foundation 2021. SUDEP is most commonly seen in Epilepsy patient's who's seizures are not well controlled or who have seizures during their sleep. This condition is not fully understood. To reduce your risk of SUDEP, please take your seizure medications as prescribed, keep a diary of your seizures and when they occur and if your seizures are not well controlled, please " notify us so we can collaborate with you to get your seizures better controlled. If you have additional questions, please visit: [email or web address] for more information.    During this visit the following were done:  Labs Reviewed [x]    Labs Ordered []    Radiology Reports Reviewed []    Radiology Ordered []    PCP Records Reviewed [x]    Referring Provider Records Reviewed []    ER Records Reviewed []    Hospital Records Reviewed []    History Obtained From Family []    Radiology Images Reviewed []    Other Reviewed []    Records Requested []      Transcribed from ambient dictation for JIMENEZ Morales by Marilee Rivera.  01/13/23   14:53 EST    Patient or patient representative verbalized consent to the visit recording.  I have personally performed the services described in this document as transcribed by the above individual, and it is both accurate and complete.  JIMENEZ Morales  1/14/2023  19:02 EST

## 2023-01-13 NOTE — LETTER
January 14, 2023     JIMENEZ Rasmussen  [id number] [id number]    Patient: Val Bloom   YOB: 1980   Date of Visit: 1/13/2023       Dear JIMENEZ Rasmussen    Val Bloom was in my office today. Below is a copy of my note.    If you have questions, please do not hesitate to call me. I look forward to following Val along with you.         Sincerely,        JIMENEZ Morales        CC: No Recipients    Subjective:     Patient ID: Val Bloom is a 42 y.o. female.    CC:   Chief Complaint   Patient presents with   • Seizures       HPI:   History of Present Illness   Today 1/13/2023-  This is a 42-year-old female who presents for 1 year neurology follow-up on seizure disorder and migraines. She had 2 lifetime seizure episodes, once in first grade and then once in 2015. She has been well controlled on levetiracetam  mg daily. She also takes folic acid 2 mg daily. She does eventually plan to have children. She had an EEG in 2016, which was normal. Previous MRI of the brain in 2016 showed several small bifrontal gliotic foci felt to be related to migraines. Migraines have generally been just a few times per month. Her mother also has epilepsy and migraines. The seizure she had as a child was a generalized convulsive seizure. The second seizure she had was witnessed by her  and was in the night. She would not respond to him and was unable to speak and was staring. Previously followed in clinic long term by Dr. Adrián Pringle, neurology. She is here today for follow-up and refills on medications. We prescribed sumatriptan for her to take as needed for migraines at last visit.    She states she is doing well overall. She denies any changes in her health since her last visit.     She has not tried the sumatriptan yet because she has taken 1 Excedrin. She states if the Excedrin does not work then she would have taken sumatriptan.  She denies any recurrent seizures. She denies any migraines.     She reports that she had a rough day at work 1 year ago. After work, she went home and had a small glass of wine. She did not see her 's shoes and tripped over and landed on the glass. She had to receive stitches.     She recently had a regular gynecologist checkup. She has been having irregular menstrual cycles since 2020. She is still taking folic acid and medroxyprogesterone. She denies any pregnancy.     The following portions of the patient's history were reviewed and updated as appropriate: allergies, current medications, past family history, past medical history, past social history, past surgical history and problem list.    Past Medical History:   Diagnosis Date   • Aspiration pneumonia (HCC) 09/15/2016   • Chronic left-sided low back pain with left-sided sciatica    • Migraine without aura    • Seizures (HCC)     first grade initial and then 2nd in 2015       Past Surgical History:   Procedure Laterality Date   • NO PAST SURGERIES         Social History     Socioeconomic History   • Marital status:    Tobacco Use   • Smoking status: Never   • Smokeless tobacco: Never   Vaping Use   • Vaping Use: Never used   Substance and Sexual Activity   • Alcohol use: Not Currently     Comment: Occasional   • Drug use: No   • Sexual activity: Not Currently     Partners: Male     Birth control/protection: None       Family History   Problem Relation Age of Onset   • Alcohol abuse Mother    • Alzheimer's disease Mother    • Stroke Mother    • Seizures Mother         Epilepsy   • Hypertension Mother    • Cancer Mother         History of malignant neoplasm   • Migraines Mother    • Breast cancer Paternal Aunt    • Dementia Maternal Grandfather         My mom's Dad   • Ovarian cancer Neg Hx    • Uterine cancer Neg Hx    • Colon cancer Neg Hx    • Osteoporosis Neg Hx           Current Outpatient Medications:   •  folic acid (FOLVITE) 1 MG tablet,  "Take 2 tablets by mouth Daily., Disp: 180 tablet, Rfl: 4  •  levETIRAcetam XR (KEPPRA XR) 500 MG 24 hr tablet, Take 1 tablet by mouth Daily., Disp: 90 tablet, Rfl: 4  •  medroxyPROGESTERone (PROVERA) 10 MG tablet, Take 1 tablet by mouth Daily., Disp: 10 tablet, Rfl: 5  •  SUMAtriptan (Imitrex) 50 MG tablet, Take one tablet at onset of headache. May repeat dose one time in 2 hours if headache not relieved., Disp: 27 tablet, Rfl: 4     Review of Systems   Constitutional: Negative for chills, fatigue, fever and unexpected weight change.   HENT: Negative for ear pain, hearing loss, nosebleeds, rhinorrhea and sore throat.    Eyes: Negative for photophobia, pain, discharge, itching and visual disturbance.   Respiratory: Negative for cough, chest tightness, shortness of breath and wheezing.    Cardiovascular: Negative for chest pain, palpitations and leg swelling.   Gastrointestinal: Negative for abdominal pain, blood in stool, constipation, diarrhea, nausea and vomiting.   Genitourinary: Negative for dysuria, frequency, hematuria and urgency.   Musculoskeletal: Negative for arthralgias, back pain, gait problem, joint swelling, myalgias, neck pain and neck stiffness.   Skin: Negative for rash and wound.   Allergic/Immunologic: Negative for environmental allergies and food allergies.   Neurological: Negative for dizziness, tremors, seizures, syncope, speech difficulty, weakness, light-headedness, numbness and headaches.   Hematological: Negative for adenopathy. Does not bruise/bleed easily.   Psychiatric/Behavioral: Negative for agitation, confusion, decreased concentration, hallucinations, sleep disturbance and suicidal ideas. The patient is not nervous/anxious.    All other systems reviewed and are negative.       Objective:  /70   Pulse 108   Ht 157.5 cm (62\")   Wt 90.7 kg (200 lb)   SpO2 98%   BMI 36.58 kg/m²     Neurologic Exam     Mental Status   Oriented to person, place, and time.   Speech: speech is " normal   Level of consciousness: alert    Cranial Nerves   Cranial nerves II through XII intact.     Motor Exam   Muscle bulk: normal  Overall muscle tone: normal    Strength   Strength 5/5 throughout.     Gait, Coordination, and Reflexes     Gait  Gait: normal    Coordination   Finger to nose coordination: normal    Tremor   Resting tremor: absent  Intention tremor: absent  Action tremor: absent    Reflexes   Reflexes 2+ except as noted.   Right : 2+  Left : 2+      Physical Exam  Constitutional:       Appearance: Normal appearance. She is obese.      Comments: BMI 36.6   Neurological:      Mental Status: She is alert and oriented to person, place, and time.      Cranial Nerves: Cranial nerves 2-12 are intact.      Motor: Motor strength is normal.      Coordination: Finger-Nose-Finger Test normal.      Gait: Gait is intact.   Psychiatric:         Mood and Affect: Mood and affect normal.         Speech: Speech normal.         Behavior: Behavior normal.         Thought Content: Thought content normal.         Cognition and Memory: Cognition and memory normal.         Judgment: Judgment normal.     Results:  She had blood work on 10/05/2022 with primary care provider of last year. CBC was normal. Comprehensive metabolic panel was normal. Total cholesterol was normal at 185 mg/dL, triglycerides were good at 145 mg/dL, HDL was a little bit low at 40 mg/dL, and LDL was elevated at 123 mg/dL. Hemoglobin A1c was normal at 5.3 percent. Thyroid stimulated hormone was normal.    Assessment/Plan:      Diagnoses and all orders for this visit:    1. Seizure disorder (HCC) (Primary)  -     folic acid (FOLVITE) 1 MG tablet; Take 2 tablets by mouth Daily.  Dispense: 180 tablet; Refill: 4  -     levETIRAcetam XR (KEPPRA XR) 500 MG 24 hr tablet; Take 1 tablet by mouth Daily.  Dispense: 90 tablet; Refill: 4    2. Migraine without aura and without status migrainosus, not intractable  -     SUMAtriptan (Imitrex) 50 MG tablet;  "Take one tablet at onset of headache. May repeat dose one time in 2 hours if headache not relieved.  Dispense: 27 tablet; Refill: 4          She is going to continue her current medications.    She does follow with her gynecologist provider for abnormal menstrual cycles and suspicion of PCOS. She does get regular labs with her PCP, which we reviewed today.    We will follow up in 1 year or sooner if needed.    Reviewed medications, potential side effects and signs and symptoms to report. Discussed risk versus benefits of treatment plan with patient and/or family-including medications, labs and radiology that may be ordered. Addressed questions and concerns during visit. Patient and/or family verbalized understanding and agree with plan.    AS THE PROVIDER, I PERSONALLY WORE PPE DURING ENTIRE FACE TO FACE ENCOUNTER IN CLINIC WITH THE PATIENT. PATIENT ALSO WORE PPE DURING ENTIRE FACE TO FACE ENCOUNTER EXCEPT FOR A MAX OF 30 SECONDS DURING NEUROLOGICAL EVALUATION OF CRANIAL NERVES AND THEN MASK WAS PLACED BACK OVER PATIENT FACE FOR REMAINDER OF VISIT. I WASHED MY HANDS BEFORE AND AFTER VISIT.    Patient instructions include: No driving or operating heavy machinery, solo bathing or tub baths for 90 days from onset of most recent seizure, if they currently hold a license. Minimize stress as much as possible. Recommended 7-8 hours of sleep each night. Abstain from alcohol intake. Educated on Antiepileptic medications with possible side effects and signs and symptoms to report if prescribed during visit. Instructed to take seizure medication daily if prescribed. Reviewed potential seizure risk factors. Instructed to call 911 or our office if another seizure does occur.    Sudden Unexpected Death in Epilepsy is defined as \"the sudden, unexpected, witnessed or unwitnessed, non-traumatic, and non-drowning death in patients with epilepsy with or without evidence for a seizure, and excluding documented status epilepticus, in " "which postmortem examination does not reveal a structural or toxicological cause for death.\"-Epilepsy Foundation 2021. SUDEP is most commonly seen in Epilepsy patient's who's seizures are not well controlled or who have seizures during their sleep. This condition is not fully understood. To reduce your risk of SUDEP, please take your seizure medications as prescribed, keep a diary of your seizures and when they occur and if your seizures are not well controlled, please notify us so we can collaborate with you to get your seizures better controlled. If you have additional questions, please visit: [email or web address] for more information.    During this visit the following were done:  Labs Reviewed [x]    Labs Ordered []    Radiology Reports Reviewed []    Radiology Ordered []    PCP Records Reviewed [x]    Referring Provider Records Reviewed []    ER Records Reviewed []    Hospital Records Reviewed []    History Obtained From Family []    Radiology Images Reviewed []    Other Reviewed []    Records Requested []      Transcribed from ambient dictation for JIMENEZ Morales by Marilee Rivera.  01/13/23   14:53 EST    Patient or patient representative verbalized consent to the visit recording.  I have personally performed the services described in this document as transcribed by the above individual, and it is both accurate and complete.  JIMENEZ Morales  1/14/2023  19:02 EST            "

## 2023-01-31 DIAGNOSIS — G40.909 SEIZURE DISORDER: ICD-10-CM

## 2023-02-01 RX ORDER — LEVETIRACETAM 500 MG/1
500 TABLET, EXTENDED RELEASE ORAL DAILY
Qty: 90 TABLET | Refills: 4 | Status: SHIPPED | OUTPATIENT
Start: 2023-02-01

## 2023-02-08 NOTE — PROGRESS NOTES
Subjective   Chief Complaint   Patient presents with   • Annual Exam     WELL WOMAN EXAM     Val Bloom is a 42 y.o. year old  presenting to be seen for her annual exam. Her last pap was 2021 with normal cytology and negative hpv cotesting. She had a birads 1 mammogram 2022.   She is taking provera monthly for endometrial protection due to oligomenorrhea.  SEXUAL Hx:  She is NOT currently sexually active.  In the past year there there has been NO new sexual partners.    Condoms are never used.  She would not like to be screened for STD's at today's exam.  Current birth control method: abstinence.  She is happy with her current method of contraception and does not want to discuss alternative methods of contraception.  MENSTRUAL Hx:  Patient's last menstrual period was 2023 (exact date).  In the past 6 months her cycles have been regular, predictable and occur monthly.  Her menstrual flow is typically normal.   Each month on average there are roughly 0 day(s) of very heavy flow.    Intermenstrual bleeding is absent.    Post-coital bleeding is n/a.  Dysmenorrhea: mild and is not affecting her activities of daily living  PMS: is not affecting her activities of daily living  Her cycles are not a source of concern for her that she wishes to discuss today.  HEALTH Hx:  She exercises regularly: no (but is planning to start exercising more).  She wears her seat belt: yes.  She has concerns about domestic violence: no.  OTHER THINGS SHE WANTS TO DISCUSS TODAY:  Nothing else    The following portions of the patient's history were reviewed and updated as appropriate:problem list, current medications, allergies, past family history, past medical history, past social history and past surgical history.    Social History    Tobacco Use      Smoking status: Never      Smokeless tobacco: Never    Review of Systems  Constitutional POS: nothing reported    NEG: anorexia or night sweats   Genitourinary POS:  "nothing reported    NEG: dysuria or hematuria      Gastointestinal POS: nothing reported    NEG: bloating, change in bowel habits, melena or reflux symptoms   Integument POS: nothing reported    NEG: moles that are changing in size, shape, color or rashes   Breast POS: nothing reported    NEG: persistent breast lump, skin dimpling or nipple discharge        Objective   /80 (BP Location: Right arm, Patient Position: Sitting, Cuff Size: Adult)   Ht 157.5 cm (62\")   Wt 90.3 kg (199 lb)   LMP 02/06/2023 (Exact Date)   BMI 36.40 kg/m²     General:  well developed; well nourished  no acute distress   Skin:  No suspicious lesions seen   Thyroid: normal to inspection and palpation   Breasts:  Examined in supine position  Symmetric without masses or skin dimpling  Nipples normal without inversion, lesions or discharge  There are no palpable axillary nodes   Abdomen: soft, non-tender; no masses  no umbilical or inguinal hernias are present  no hepato-splenomegaly   Pelvis: Clinical staff was present for exam  :  urethral meatus normal;  Vaginal:  normal pink mucosa without prolapse or lesions.  Cervix:  normal appearance.  Uterus:  normal size, shape and consistency.  Adnexa:  normal bimanual exam of the adnexa.  Rectal:  digital rectal exam not performed; anus visually normal appearing.  Exam limited by patient body habitus        Assessment   1. Well woman gynecological exam  2. Oligomenorrhea- doing well on monthly provera     Plan     1. Pap was not done today.  I explained to Val that the recommendations for Pap smear interval in a low risk patient who also has negative hpv cotesting, has lengthened to 5 years time.  I told Val she still needs to be seen in our office yearly for a full physical including breast and pelvic exam.  2. She was encouraged to get yearly mammograms.  She should report any palpable breast lump(s) or skin changes regardless of mammographic findings.  I explained to Val that " notification regarding her mammogram results will come from the center performing the study.  Our office will not be routinely calling with mammogram results.  It is her responsibility to make sure that the results from the mammogram are communicated to her by the breast center.  If she has any questions about the results, she is welcome to call our office anytime.  3. Prescription(s) for provera were refilled today   4. The importance of keeping all planned follow-up and taking all medications as prescribed was emphasized.  5. Today I discussed with Val the total recommended calcium intake for a premenopausal female is 1000 mg.  Ideally this should be from dietary sources.  I reviewed calcium content in various foods including milk, fortified orange juice and yogurt.  If she cannot get sufficient calcium through dietary means, it is recommended to supplement with either a multivitamin or calcium to reach her daily goal.  I also reviewed the difference in the bioavailability of calcium carbonate and calcium citrate containing supplements and the importance of taking calcium carbonate containing products with food.  Finally, vitamin D's role in calcium absorption was reviewed and a total daily vitamin D intake of 800 units was recommended.  6. Follow up for annual exam 1 year    No orders of the defined types were placed in this encounter.         This note was electronically signed.    Ashley Spaulding, JIMENEZ  February 9, 2023

## 2023-02-09 ENCOUNTER — OFFICE VISIT (OUTPATIENT)
Dept: OBSTETRICS AND GYNECOLOGY | Facility: CLINIC | Age: 43
End: 2023-02-09
Payer: COMMERCIAL

## 2023-02-09 VITALS
WEIGHT: 199 LBS | BODY MASS INDEX: 36.62 KG/M2 | DIASTOLIC BLOOD PRESSURE: 80 MMHG | SYSTOLIC BLOOD PRESSURE: 110 MMHG | HEIGHT: 62 IN

## 2023-02-09 DIAGNOSIS — N91.3 PRIMARY OLIGOMENORRHEA: Primary | ICD-10-CM

## 2023-02-09 DIAGNOSIS — Z01.419 WELL WOMAN EXAM WITH ROUTINE GYNECOLOGICAL EXAM: ICD-10-CM

## 2023-02-09 PROCEDURE — 99396 PREV VISIT EST AGE 40-64: CPT | Performed by: NURSE PRACTITIONER

## 2023-02-09 RX ORDER — MEDROXYPROGESTERONE ACETATE 10 MG/1
10 TABLET ORAL DAILY
Qty: 10 TABLET | Refills: 11 | Status: SHIPPED | OUTPATIENT
Start: 2023-02-09

## 2023-02-09 RX ORDER — TRIAMCINOLONE ACETONIDE 1 MG/G
CREAM TOPICAL
COMMUNITY
Start: 2023-02-07

## 2023-02-15 ENCOUNTER — TRANSCRIBE ORDERS (OUTPATIENT)
Dept: ADMINISTRATIVE | Facility: HOSPITAL | Age: 43
End: 2023-02-15
Payer: COMMERCIAL

## 2023-02-15 DIAGNOSIS — Z12.31 VISIT FOR SCREENING MAMMOGRAM: Primary | ICD-10-CM

## 2023-03-31 DIAGNOSIS — G40.909 SEIZURE DISORDER: ICD-10-CM

## 2023-03-31 RX ORDER — FOLIC ACID 1 MG/1
2 TABLET ORAL DAILY
Qty: 180 TABLET | Refills: 4 | Status: SHIPPED | OUTPATIENT
Start: 2023-03-31

## 2023-03-31 NOTE — TELEPHONE ENCOUNTER
Rx Refill Note  Requested Prescriptions     Pending Prescriptions Disp Refills   • folic acid (FOLVITE) 1 MG tablet [Pharmacy Med Name: FOLIC ACID 1MG TABLETS] 180 tablet 4     Sig: TAKE 2 TABLETS BY MOUTH DAILY      Last office visit with prescribing clinician: 1/13/2023   Last telemedicine visit with prescribing clinician: Visit date not found   Next office visit with prescribing clinician: 1/12/2024                         Would you like a call back once the refill request has been completed: [] Yes [] No    If the office needs to give you a call back, can they leave a voicemail: [] Yes [] No    Rachele Camarillo CMA  03/31/23, 08:38 EDT

## 2023-05-22 ENCOUNTER — HOSPITAL ENCOUNTER (OUTPATIENT)
Dept: MAMMOGRAPHY | Facility: HOSPITAL | Age: 43
Discharge: HOME | End: 2023-05-22
Admitting: NURSE PRACTITIONER
Payer: COMMERCIAL

## 2023-05-22 DIAGNOSIS — Z12.31 VISIT FOR SCREENING MAMMOGRAM: ICD-10-CM

## 2023-05-22 PROCEDURE — 77063 BREAST TOMOSYNTHESIS BI: CPT

## 2023-05-22 PROCEDURE — 77067 SCR MAMMO BI INCL CAD: CPT

## 2023-05-22 PROCEDURE — 77067 SCR MAMMO BI INCL CAD: CPT | Performed by: RADIOLOGY

## 2023-05-22 PROCEDURE — 77063 BREAST TOMOSYNTHESIS BI: CPT | Performed by: RADIOLOGY

## 2023-10-19 ENCOUNTER — OFFICE VISIT (OUTPATIENT)
Dept: INTERNAL MEDICINE | Facility: CLINIC | Age: 43
End: 2023-10-19
Payer: COMMERCIAL

## 2023-10-19 VITALS
SYSTOLIC BLOOD PRESSURE: 110 MMHG | HEART RATE: 84 BPM | DIASTOLIC BLOOD PRESSURE: 78 MMHG | HEIGHT: 62 IN | WEIGHT: 208 LBS | OXYGEN SATURATION: 99 % | BODY MASS INDEX: 38.28 KG/M2

## 2023-10-19 DIAGNOSIS — Z11.59 ENCOUNTER FOR HEPATITIS C SCREENING TEST FOR LOW RISK PATIENT: ICD-10-CM

## 2023-10-19 DIAGNOSIS — Z13.21 ENCOUNTER FOR VITAMIN DEFICIENCY SCREENING: ICD-10-CM

## 2023-10-19 DIAGNOSIS — G40.909 SEIZURE DISORDER: ICD-10-CM

## 2023-10-19 DIAGNOSIS — Z13.1 SCREENING FOR DIABETES MELLITUS: ICD-10-CM

## 2023-10-19 DIAGNOSIS — Z13.0 SCREENING FOR BLOOD DISEASE: ICD-10-CM

## 2023-10-19 DIAGNOSIS — Z00.00 WELLNESS EXAMINATION: Primary | ICD-10-CM

## 2023-10-19 DIAGNOSIS — G43.009 MIGRAINE WITHOUT AURA AND WITHOUT STATUS MIGRAINOSUS, NOT INTRACTABLE: ICD-10-CM

## 2023-10-19 DIAGNOSIS — Z13.220 LIPID SCREENING: ICD-10-CM

## 2023-10-19 DIAGNOSIS — Z13.29 THYROID DISORDER SCREENING: ICD-10-CM

## 2023-10-19 DIAGNOSIS — E55.9 VITAMIN D DEFICIENCY: ICD-10-CM

## 2023-10-19 PROCEDURE — 99396 PREV VISIT EST AGE 40-64: CPT | Performed by: NURSE PRACTITIONER

## 2023-10-19 NOTE — PROGRESS NOTES
Subjective   Chief Complaint   Patient presents with    Annual Exam       Val Bloom is a 43 y.o. female here today for annual exam.    HPI    The patient is here today for a physical.    The patient is doing well. She denies any problems. She denies any seizures. Her migraines are manageable. She is taking Aleve and it does not help. Her neurologist gave her Imitrex. She is still exercising. She was not eating all that great, but she was trying to improve it. She denies taking any vitamin supplements. She rarely drinks alcohol. She denies any tobacco use or vaping. She is seeing gynecology. Her menstrual cycles are not regular. She was put on Provera for 10 days. She denies any urinary issues. She had a mammogram in 05/2023. She has a family history of breast cancer in her aunts. She has never had a colonoscopy. Her bowel movements are normal. Her dental exam is up to date. She has not had an eye exam in a while. Her immunizations are overall up to date. She wears a seatbelt and sunscreen for the most part. She denies any dyspnea or chest pain. She denies any dysphagia. She denies any leg swelling. She denies any problems with her skin.        Overall healthy:  Yes  Regular exercise:  No  Diet is well balanced:  No and Improving  Vitamin Supplement:  No  Alcohol intake:  Yes, rare  Tobacco use:  No  Cardiovascular risk is low:  Low  Menstrual cycle regular:  Irregular and seeing gynecology  PAP:  Up to date and Normal  :  No urinary issues  Mammogram:  Up to date and FH, aunts  Colonoscopy:  N/A and No FH  GI:  Normal BM  Regular dental exam:  Up to date  Regular eye exam:  Due and Will self-schedule  Immunizations up to date:  Up to date  Wear a seatbelt regularly:  Yes  Wear sunscreen regularly when outdoors:  Yes    Past Medical History:   Diagnosis Date    Aspiration pneumonia 09/15/2016    Chronic left-sided low back pain with left-sided sciatica     Migraine without aura     Seizures     first grade  "initial and then 2nd in 2015     Past Surgical History:   Procedure Laterality Date    NO PAST SURGERIES       Family History   Problem Relation Age of Onset    Alcohol abuse Mother     Alzheimer's disease Mother     Stroke Mother     Seizures Mother         Epilepsy    Hypertension Mother     Cancer Mother         History of malignant neoplasm    Migraines Mother     Breast cancer Paternal Aunt     Dementia Maternal Grandfather         My mom's Dad    Stroke Maternal Grandfather     Ovarian cancer Neg Hx     Uterine cancer Neg Hx     Colon cancer Neg Hx     Osteoporosis Neg Hx      Social History     Tobacco Use   Smoking Status Never   Smokeless Tobacco Never      Social History     Substance and Sexual Activity   Alcohol Use Not Currently    Comment: Occasional      No Known Allergies    Review of Systems  Pertinent items are noted in HPI.     Current Outpatient Medications on File Prior to Visit   Medication Sig    folic acid (FOLVITE) 1 MG tablet TAKE 2 TABLETS BY MOUTH DAILY    levETIRAcetam XR (KEPPRA XR) 500 MG 24 hr tablet TAKE 1 TABLET BY MOUTH DAILY    medroxyPROGESTERone (PROVERA) 10 MG tablet Take 1 tablet by mouth Daily.    SUMAtriptan (Imitrex) 50 MG tablet Take one tablet at onset of headache. May repeat dose one time in 2 hours if headache not relieved.    triamcinolone (KENALOG) 0.1 % cream APPLY TOPICALLY TO THE AFFECTED AREA THREE TIMES DAILY FOR 5 DAYS     No current facility-administered medications on file prior to visit.       Objective   Vitals:    10/19/23 1335   BP: 110/78   BP Location: Left arm   Patient Position: Sitting   Pulse: 84   SpO2: 99%   Weight: 94.3 kg (208 lb)   Height: 157.5 cm (62.01\")     Body mass index is 38.03 kg/m².    Physical Exam  Vitals reviewed.   Constitutional:       Appearance: Normal appearance. She is well-developed.   HENT:      Head: Normocephalic and atraumatic.      Right Ear: Hearing, tympanic membrane, ear canal and external ear normal.      Left Ear: " Hearing, tympanic membrane, ear canal and external ear normal.      Nose: Nose normal.      Mouth/Throat:      Lips: Pink.      Mouth: Mucous membranes are moist.      Pharynx: Oropharynx is clear. Uvula midline.   Eyes:      General: Lids are normal.      Extraocular Movements: Extraocular movements intact.      Conjunctiva/sclera: Conjunctivae normal.      Pupils: Pupils are equal, round, and reactive to light.   Neck:      Thyroid: No thyromegaly.      Trachea: Trachea normal.   Cardiovascular:      Rate and Rhythm: Normal rate and regular rhythm.      Pulses:           Carotid pulses are 2+ on the right side and 2+ on the left side.     Heart sounds: Normal heart sounds.   Pulmonary:      Effort: Pulmonary effort is normal. No respiratory distress.      Breath sounds: Normal breath sounds.   Abdominal:      General: Bowel sounds are normal.      Palpations: Abdomen is soft.      Tenderness: There is no abdominal tenderness.   Skin:     General: Skin is warm and dry.      Capillary Refill: Capillary refill takes 2 to 3 seconds.   Neurological:      Mental Status: She is alert and oriented to person, place, and time.      GCS: GCS eye subscore is 4. GCS verbal subscore is 5. GCS motor subscore is 6.   Psychiatric:         Attention and Perception: Attention normal.         Mood and Affect: Mood normal.         Speech: Speech normal.         Behavior: Behavior normal. Behavior is cooperative.         Thought Content: Thought content normal.                  Assessment & Plan     Current Outpatient Medications:     folic acid (FOLVITE) 1 MG tablet, TAKE 2 TABLETS BY MOUTH DAILY, Disp: 180 tablet, Rfl: 4    levETIRAcetam XR (KEPPRA XR) 500 MG 24 hr tablet, TAKE 1 TABLET BY MOUTH DAILY, Disp: 90 tablet, Rfl: 4    medroxyPROGESTERone (PROVERA) 10 MG tablet, Take 1 tablet by mouth Daily., Disp: 10 tablet, Rfl: 11    SUMAtriptan (Imitrex) 50 MG tablet, Take one tablet at onset of headache. May repeat dose one time in 2  hours if headache not relieved., Disp: 27 tablet, Rfl: 4    triamcinolone (KENALOG) 0.1 % cream, APPLY TOPICALLY TO THE AFFECTED AREA THREE TIMES DAILY FOR 5 DAYS, Disp: , Rfl:     vitamin D (ERGOCALCIFEROL) 1.25 MG (05190 UT) capsule capsule, Take 1 capsule by mouth 1 (One) Time Per Week., Disp: 12 capsule, Rfl: 1    Problem List Items Addressed This Visit       Seizure disorder    Migraine without aura     Other Visit Diagnoses       Wellness examination    -  Primary    Relevant Orders    CBC (No Diff) (Completed)    Comprehensive Metabolic Panel (Completed)    Lipid Panel (Completed)    Hemoglobin A1c (Completed)    TSH Rfx On Abnormal To Free T4 (Completed)    Vitamin B12 & Folate (Completed)    Vitamin D,25-Hydroxy (Completed)    Hepatitis C Antibody (Completed)    Screening for blood disease        Relevant Orders    CBC (No Diff) (Completed)    Comprehensive Metabolic Panel (Completed)    Lipid Panel (Completed)    Hemoglobin A1c (Completed)    TSH Rfx On Abnormal To Free T4 (Completed)    Vitamin B12 & Folate (Completed)    Vitamin D,25-Hydroxy (Completed)    Hepatitis C Antibody (Completed)    Thyroid disorder screening        Relevant Orders    TSH Rfx On Abnormal To Free T4 (Completed)    Lipid screening        Relevant Orders    Lipid Panel (Completed)    Encounter for vitamin deficiency screening        Relevant Orders    Vitamin B12 & Folate (Completed)    Vitamin D,25-Hydroxy (Completed)    Screening for diabetes mellitus        Relevant Orders    Hemoglobin A1c (Completed)    Encounter for hepatitis C screening test for low risk patient        Relevant Orders    Hepatitis C Antibody (Completed)    Vitamin D deficiency        Relevant Orders    Vitamin D,25-Hydroxy (Completed)          1. Wellness exam.  - This provider will insert a statement there.    2. Seizure disorder.  - Chronic, stable.  - Continue Keppra.  - Continue follow-ups with neurology.    3. Migraine.  - Chronic, stable.  - Continue  Imitrex as needed.  - Continue follow-ups with neurology as needed.           Counseling was given to patient for the following topics: appropriate exercise, healthy eating habits, disease prevention, risk factors for cancer, importance of self breast exam and breast health, importance of immunizations, including risks and benefits, bone health, sun safety, and seatbelt use.      Plan of care reviewed with the patient at the conclusion of today's visit.  Education was provided regarding diagnosis, management, and any prescribed or recommended OTC medications.  Patient verbalized understanding of and agreement with management plan.     Return if symptoms worsen or fail to improve.        Transcribed from ambient dictation for JIMENEZ Rios by Makayla Boyle.  10/19/23   16:44 EDT    Patient or patient representative verbalized consent to the visit recording.  I have personally performed the services described in this document as transcribed by the above individual, and it is both accurate and complete.

## 2023-10-26 ENCOUNTER — LAB (OUTPATIENT)
Dept: INTERNAL MEDICINE | Facility: CLINIC | Age: 43
End: 2023-10-26
Payer: COMMERCIAL

## 2023-10-26 DIAGNOSIS — Z00.00 WELLNESS EXAMINATION: ICD-10-CM

## 2023-10-26 DIAGNOSIS — E55.9 VITAMIN D DEFICIENCY: ICD-10-CM

## 2023-10-26 DIAGNOSIS — Z13.220 LIPID SCREENING: ICD-10-CM

## 2023-10-26 DIAGNOSIS — Z13.21 ENCOUNTER FOR VITAMIN DEFICIENCY SCREENING: ICD-10-CM

## 2023-10-26 DIAGNOSIS — Z11.59 ENCOUNTER FOR HEPATITIS C SCREENING TEST FOR LOW RISK PATIENT: ICD-10-CM

## 2023-10-26 DIAGNOSIS — Z13.29 THYROID DISORDER SCREENING: ICD-10-CM

## 2023-10-26 DIAGNOSIS — Z13.1 SCREENING FOR DIABETES MELLITUS: ICD-10-CM

## 2023-10-26 DIAGNOSIS — Z13.0 SCREENING FOR BLOOD DISEASE: ICD-10-CM

## 2023-10-26 LAB
ALBUMIN SERPL-MCNC: 4.2 G/DL (ref 3.5–5.2)
ALBUMIN/GLOB SERPL: 1.5 G/DL
ALP SERPL-CCNC: 71 U/L (ref 39–117)
ALT SERPL W P-5'-P-CCNC: 23 U/L (ref 1–33)
ANION GAP SERPL CALCULATED.3IONS-SCNC: 11.4 MMOL/L (ref 5–15)
AST SERPL-CCNC: 22 U/L (ref 1–32)
BILIRUB SERPL-MCNC: 0.3 MG/DL (ref 0–1.2)
BUN SERPL-MCNC: 18 MG/DL (ref 6–20)
BUN/CREAT SERPL: 23.4 (ref 7–25)
CALCIUM SPEC-SCNC: 8.9 MG/DL (ref 8.6–10.5)
CHLORIDE SERPL-SCNC: 104 MMOL/L (ref 98–107)
CHOLEST SERPL-MCNC: 200 MG/DL (ref 0–200)
CO2 SERPL-SCNC: 20.6 MMOL/L (ref 22–29)
CREAT SERPL-MCNC: 0.77 MG/DL (ref 0.57–1)
DEPRECATED RDW RBC AUTO: 43 FL (ref 37–54)
EGFRCR SERPLBLD CKD-EPI 2021: 99.5 ML/MIN/1.73
ERYTHROCYTE [DISTWIDTH] IN BLOOD BY AUTOMATED COUNT: 12.7 % (ref 12.3–15.4)
GLOBULIN UR ELPH-MCNC: 2.8 GM/DL
GLUCOSE SERPL-MCNC: 79 MG/DL (ref 65–99)
HCT VFR BLD AUTO: 42.2 % (ref 34–46.6)
HDLC SERPL-MCNC: 42 MG/DL (ref 40–60)
HGB BLD-MCNC: 14.6 G/DL (ref 12–15.9)
LDLC SERPL CALC-MCNC: 138 MG/DL (ref 0–100)
LDLC/HDLC SERPL: 3.24 {RATIO}
MCH RBC QN AUTO: 32.1 PG (ref 26.6–33)
MCHC RBC AUTO-ENTMCNC: 34.6 G/DL (ref 31.5–35.7)
MCV RBC AUTO: 92.7 FL (ref 79–97)
PLATELET # BLD AUTO: 209 10*3/MM3 (ref 140–450)
PMV BLD AUTO: 9.9 FL (ref 6–12)
POTASSIUM SERPL-SCNC: 4 MMOL/L (ref 3.5–5.2)
PROT SERPL-MCNC: 7 G/DL (ref 6–8.5)
RBC # BLD AUTO: 4.55 10*6/MM3 (ref 3.77–5.28)
SODIUM SERPL-SCNC: 136 MMOL/L (ref 136–145)
TRIGL SERPL-MCNC: 110 MG/DL (ref 0–150)
VLDLC SERPL-MCNC: 20 MG/DL (ref 5–40)
WBC NRBC COR # BLD: 7.35 10*3/MM3 (ref 3.4–10.8)

## 2023-10-26 PROCEDURE — 82607 VITAMIN B-12: CPT | Performed by: NURSE PRACTITIONER

## 2023-10-26 PROCEDURE — 36415 COLL VENOUS BLD VENIPUNCTURE: CPT | Performed by: NURSE PRACTITIONER

## 2023-10-26 PROCEDURE — 82306 VITAMIN D 25 HYDROXY: CPT | Performed by: NURSE PRACTITIONER

## 2023-10-26 PROCEDURE — 83036 HEMOGLOBIN GLYCOSYLATED A1C: CPT | Performed by: NURSE PRACTITIONER

## 2023-10-26 PROCEDURE — 80050 GENERAL HEALTH PANEL: CPT | Performed by: NURSE PRACTITIONER

## 2023-10-26 PROCEDURE — 80061 LIPID PANEL: CPT | Performed by: NURSE PRACTITIONER

## 2023-10-26 PROCEDURE — 86803 HEPATITIS C AB TEST: CPT | Performed by: NURSE PRACTITIONER

## 2023-10-26 PROCEDURE — 82746 ASSAY OF FOLIC ACID SERUM: CPT | Performed by: NURSE PRACTITIONER

## 2023-10-27 LAB
25(OH)D3 SERPL-MCNC: 20.3 NG/ML (ref 30–100)
FOLATE SERPL-MCNC: >20 NG/ML (ref 4.78–24.2)
HBA1C MFR BLD: 5.5 % (ref 4.8–5.6)
HCV AB SER DONR QL: NORMAL
TSH SERPL DL<=0.05 MIU/L-ACNC: 1.58 UIU/ML (ref 0.27–4.2)
VIT B12 BLD-MCNC: 527 PG/ML (ref 211–946)

## 2023-10-27 RX ORDER — ERGOCALCIFEROL 1.25 MG/1
50000 CAPSULE ORAL WEEKLY
Qty: 12 CAPSULE | Refills: 1 | Status: SHIPPED | OUTPATIENT
Start: 2023-10-27

## 2024-01-09 ENCOUNTER — OFFICE VISIT (OUTPATIENT)
Dept: NEUROLOGY | Facility: CLINIC | Age: 44
End: 2024-01-09
Payer: COMMERCIAL

## 2024-01-09 VITALS
WEIGHT: 214.5 LBS | BODY MASS INDEX: 39.47 KG/M2 | HEART RATE: 98 BPM | HEIGHT: 62 IN | OXYGEN SATURATION: 97 % | DIASTOLIC BLOOD PRESSURE: 78 MMHG | SYSTOLIC BLOOD PRESSURE: 106 MMHG

## 2024-01-09 DIAGNOSIS — G40.909 SEIZURE DISORDER: ICD-10-CM

## 2024-01-09 DIAGNOSIS — G43.009 MIGRAINE WITHOUT AURA AND WITHOUT STATUS MIGRAINOSUS, NOT INTRACTABLE: ICD-10-CM

## 2024-01-09 PROCEDURE — 99213 OFFICE O/P EST LOW 20 MIN: CPT | Performed by: NURSE PRACTITIONER

## 2024-01-09 RX ORDER — LEVETIRACETAM 500 MG/1
500 TABLET, FILM COATED, EXTENDED RELEASE ORAL DAILY
Qty: 90 TABLET | Refills: 4 | Status: SHIPPED | OUTPATIENT
Start: 2024-01-09

## 2024-01-09 RX ORDER — SUMATRIPTAN 50 MG/1
TABLET, FILM COATED ORAL
Qty: 27 TABLET | Refills: 4 | Status: SHIPPED | OUTPATIENT
Start: 2024-01-09

## 2024-01-09 RX ORDER — FOLIC ACID 1 MG/1
2 TABLET ORAL DAILY
Qty: 180 TABLET | Refills: 4 | Status: SHIPPED | OUTPATIENT
Start: 2024-01-09

## 2024-01-09 NOTE — LETTER
January 10, 2024     JIMENEZ Rasmussen  [address] [address]    Patient: Val Bloom   YOB: 1980   Date of Visit: 1/9/2024       Dear JIMENEZ Rasmussen    Val Bloom was in my office today. Below is a copy of my note.    If you have questions, please do not hesitate to call me. I look forward to following Val along with you.         Sincerely,        JIMENEZ Morales        CC: Stephen Martinez MD    Subjective:     Patient ID: Val Bloom is a 43 y.o. female.    CC:   Chief Complaint   Patient presents with   • Seizures   • Migraine       HPI:   History of Present Illness  Today 1/9/2024- This is a pleasant 43-year-old female who presents for 1 year neurology follow-up on seizures and migraines. She has had 2 total seizure episodes, once in first grade and once in 2015. She has been well-controlled on levetiracetam  mg daily. She takes folic acid 2 mg daily. She does have migraine headaches episodically. She takes sumatriptan as needed. She follows with her gynecologist for suspicion of PCOS. She is again here for follow-up and refills on medications today.     Most recent set of labs were with PCP on 10/26/2023. CBC normal. Comprehensive metabolic panel within normal limits. Lipid panel with , otherwise normal. Hemoglobin A1c normal. TSH with reflex was normal. Vitamin B12 was 527. Folate greater than 20. Vitamin D level was low at 20.3. Hepatitis C antibody negative. She has been recommended to take vitamin D.    Today, she reports she is doing well. She denies any changes in her health, surgeries, or hospital visits since her last visit. She denies any seizures. She states she is not having any trouble tolerating Keppra.    She has an appointment with her gynecologist scheduled for 02/2024.    She reports she has not had any migraines since switching jobs. 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    Prior Neurological History & Workup:  Seizure disorder and migraines. She had 2 lifetime seizure episodes, once in first grade and then once in 2015. She has been well controlled on levetiracetam  mg daily. She also takes folic acid 2 mg daily. She does eventually plan to have children.     She had an EEG in 2016, which was normal. Previous MRI of the brain in 2016 showed several small bifrontal gliotic foci felt to be related to migraines. Migraines have generally been just a few times per month. Her mother also has epilepsy and migraines. The seizure she had as a child was a generalized convulsive seizure. The second seizure she had was witnessed by her  and was in the night. She would not respond to him and was unable to speak and was staring.     Previously followed in clinic long term by Dr. Adrián Pringle, neurology.     Follows with GYN.    Episodic and rare migraines.    She has been having irregular menstrual cycles since 2020. She is still taking folic acid and medroxyprogesterone. She denies any pregnancy.      The following portions of the patient's history were reviewed and updated as appropriate: allergies, current medications, past family history, past medical history, past social history, past surgical history, and problem list.    Past Medical History:   Diagnosis Date   • Aspiration pneumonia 09/15/2016   • Chronic left-sided low back pain with left-sided sciatica    • Migraine without aura    • Seizures     first grade initial and then 2nd in 2015       Past Surgical History:   Procedure Laterality Date   • NO PAST SURGERIES         Social History     Socioeconomic History   • Marital status:    • Number of children: 0   • Highest education level: High school graduate   Tobacco Use   • Smoking status: Never     Passive exposure: Never   • Smokeless tobacco:  "Never   Vaping Use   • Vaping Use: Never used   Substance and Sexual Activity   • Alcohol use: Not Currently     Comment: Occasional   • Drug use: No   • Sexual activity: Not Currently     Partners: Male     Birth control/protection: None       Family History   Problem Relation Age of Onset   • Alcohol abuse Mother    • Alzheimer's disease Mother    • Stroke Mother    • Seizures Mother         Epilepsy   • Hypertension Mother    • Cancer Mother         History of malignant neoplasm   • Migraines Mother    • Breast cancer Paternal Aunt    • Dementia Maternal Grandfather         My mom's Dad   • Stroke Maternal Grandfather    • Ovarian cancer Neg Hx    • Uterine cancer Neg Hx    • Colon cancer Neg Hx    • Osteoporosis Neg Hx           Current Outpatient Medications:   •  folic acid (FOLVITE) 1 MG tablet, Take 2 tablets by mouth Daily., Disp: 180 tablet, Rfl: 4  •  levETIRAcetam XR (KEPPRA XR) 500 MG tablet, Take 1 tablet by mouth Daily., Disp: 90 tablet, Rfl: 4  •  medroxyPROGESTERone (PROVERA) 10 MG tablet, Take 1 tablet by mouth Daily., Disp: 10 tablet, Rfl: 11  •  SUMAtriptan (Imitrex) 50 MG tablet, Take one tablet at onset of headache. May repeat dose one time in 2 hours if headache not relieved., Disp: 27 tablet, Rfl: 4  •  vitamin D (ERGOCALCIFEROL) 1.25 MG (78886 UT) capsule capsule, Take 1 capsule by mouth 1 (One) Time Per Week., Disp: 12 capsule, Rfl: 1     Review of Systems   Neurological:  Positive for headaches. Negative for seizures.   All other systems reviewed and are negative.       Objective:  /78   Pulse 98   Ht 157.5 cm (62.01\")   Wt 97.3 kg (214 lb 8 oz)   SpO2 97%   BMI 39.22 kg/m²     Neurologic Exam     Mental Status   Oriented to person, place, and time.   Speech: speech is normal   Level of consciousness: alert    Cranial Nerves   Cranial nerves II through XII intact.     Motor Exam   Muscle bulk: normal  Overall muscle tone: normal    Strength   Strength 5/5 throughout.     Gait, " Coordination, and Reflexes     Gait  Gait: normal    Coordination   Finger to nose coordination: normal    Tremor   Resting tremor: absent  Intention tremor: absent  Action tremor: absent    Reflexes   Right : 2+  Left : 2+      Physical Exam  Constitutional:       Appearance: Normal appearance. She is obese.      Comments: BMI 39.2   Neurological:      Mental Status: She is alert and oriented to person, place, and time.      Cranial Nerves: Cranial nerves 2-12 are intact.      Motor: Motor strength is normal.     Coordination: Finger-Nose-Finger Test normal.      Gait: Gait is intact.   Psychiatric:         Mood and Affect: Mood and affect normal.         Speech: Speech normal.         Assessment/Plan:       Diagnoses and all orders for this visit:    1. Seizure disorder  -     folic acid (FOLVITE) 1 MG tablet; Take 2 tablets by mouth Daily.  Dispense: 180 tablet; Refill: 4  -     levETIRAcetam XR (KEPPRA XR) 500 MG tablet; Take 1 tablet by mouth Daily.  Dispense: 90 tablet; Refill: 4    2. Migraine without aura and without status migrainosus, not intractable  -     SUMAtriptan (Imitrex) 50 MG tablet; Take one tablet at onset of headache. May repeat dose one time in 2 hours if headache not relieved.  Dispense: 27 tablet; Refill: 4    She is doing well overall, stable. I refilled her medications.     She will follow up in 1 year or sooner if needed.    Offered rescue nasal spray in the event of a seizure, but she kindly declines today. She has been seizure free for 9 years. If she were to have another seizure, we can prescribe this.     Reviewed medications, potential side effects and signs and symptoms to report. Discussed risk versus benefits of treatment plan with patient and/or family-including medications, labs and radiology that may be ordered. Addressed questions and concerns during visit. Patient and/or family verbalized understanding and agree with plan.    Patient instructions include: No driving  "or operating heavy machinery, solo bathing or tub baths for 90 days from onset of most recent seizure, if they currently hold a license. Minimize stress as much as possible. Recommended 7-8 hours of sleep each night. Abstain from alcohol intake. Educated on Antiepileptic medications with possible side effects and signs and symptoms to report if prescribed during visit. Instructed to take seizure medication daily if prescribed. Reviewed potential seizure risk factors. Instructed to call 911 or our office if another seizure does occur.    Sudden Unexpected Death in Epilepsy is defined as \"the sudden, unexpected, witnessed or unwitnessed, non-traumatic, and non-drowning death in patients with epilepsy with or without evidence for a seizure, and excluding documented status epilepticus, in which postmortem examination does not reveal a structural or toxicological cause for death.\"-Epilepsy Foundation 2021. SUDEP is most commonly seen in Epilepsy patient's who's seizures are not well controlled or who have seizures during their sleep. This condition is not fully understood. To reduce your risk of SUDEP, please take your seizure medications as prescribed, keep a diary of your seizures and when they occur and if your seizures are not well controlled, please notify us so we can collaborate with you to get your seizures better controlled. If you have additional questions, please visit: [email or web address] for more information.      During this visit the following were done:  Labs Reviewed [x]    Labs Ordered []    Radiology Reports Reviewed []    Radiology Ordered []    PCP Records Reviewed [x]    Referring Provider Records Reviewed []    ER Records Reviewed []    Hospital Records Reviewed []    History Obtained From Family []    Radiology Images Reviewed []    Other Reviewed []    Records Requested []      Transcribed from ambient dictation for JIMENEZ Morales by Magdalena Rosenthal.  01/09/24   12:50 EST    Patient or " patient representative verbalized consent to the visit recording.  I have personally performed the services described in this document as transcribed by the above individual, and it is both accurate and complete.  Yara Hubbard Mikey, APRN  1/10/2024  07:43 EST      Note to patient: The 21st Century Cures Act makes medical notes like these available to patients in the interest of transparency. However, be advised this is a medical document. It is intended as peer to peer communication. It is written in medical language and may contain abbreviations or verbiage that are unfamiliar. It may appear blunt or direct. Medical documents are intended to carry relevant information, facts as evident, and the clinical opinion of the provider.

## 2024-01-09 NOTE — PROGRESS NOTES
Subjective:     Patient ID: Val Bloom is a 43 y.o. female.    CC:   Chief Complaint   Patient presents with    Seizures    Migraine       HPI:   History of Present Illness  Today 1/9/2024- This is a pleasant 43-year-old female who presents for 1 year neurology follow-up on seizures and migraines. She has had 2 total seizure episodes, once in first grade and once in 2015. She has been well-controlled on levetiracetam  mg daily. She takes folic acid 2 mg daily. She does have migraine headaches episodically. She takes sumatriptan as needed. She follows with her gynecologist for suspicion of PCOS. She is again here for follow-up and refills on medications today.     Most recent set of labs were with PCP on 10/26/2023. CBC normal. Comprehensive metabolic panel within normal limits. Lipid panel with , otherwise normal. Hemoglobin A1c normal. TSH with reflex was normal. Vitamin B12 was 527. Folate greater than 20. Vitamin D level was low at 20.3. Hepatitis C antibody negative. She has been recommended to take vitamin D.    Today, she reports she is doing well. She denies any changes in her health, surgeries, or hospital visits since her last visit. She denies any seizures. She states she is not having any trouble tolerating Keppra.    She has an appointment with her gynecologist scheduled for 02/2024.    She reports she has not had any migraines since switching jobs. 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    Prior Neurological History & Workup:  Seizure disorder and migraines. She had 2 lifetime seizure episodes, once in first grade and then once in 2015. She has been well controlled on levetiracetam  mg daily. She also takes folic acid 2 mg daily. She does eventually plan to have children.     She had an EEG in 2016, which was normal. Previous MRI of the brain in 2016  showed several small bifrontal gliotic foci felt to be related to migraines. Migraines have generally been just a few times per month. Her mother also has epilepsy and migraines. The seizure she had as a child was a generalized convulsive seizure. The second seizure she had was witnessed by her  and was in the night. She would not respond to him and was unable to speak and was staring.     Previously followed in clinic long term by Dr. Adrián Pringle, neurology.     Follows with GYN.    Episodic and rare migraines.    She has been having irregular menstrual cycles since 2020. She is still taking folic acid and medroxyprogesterone. She denies any pregnancy.      The following portions of the patient's history were reviewed and updated as appropriate: allergies, current medications, past family history, past medical history, past social history, past surgical history, and problem list.    Past Medical History:   Diagnosis Date    Aspiration pneumonia 09/15/2016    Chronic left-sided low back pain with left-sided sciatica     Migraine without aura     Seizures     first grade initial and then 2nd in 2015       Past Surgical History:   Procedure Laterality Date    NO PAST SURGERIES         Social History     Socioeconomic History    Marital status:     Number of children: 0    Highest education level: High school graduate   Tobacco Use    Smoking status: Never     Passive exposure: Never    Smokeless tobacco: Never   Vaping Use    Vaping Use: Never used   Substance and Sexual Activity    Alcohol use: Not Currently     Comment: Occasional    Drug use: No    Sexual activity: Not Currently     Partners: Male     Birth control/protection: None       Family History   Problem Relation Age of Onset    Alcohol abuse Mother     Alzheimer's disease Mother     Stroke Mother     Seizures Mother         Epilepsy    Hypertension Mother     Cancer Mother         History of malignant neoplasm    Migraines Mother      "Breast cancer Paternal Aunt     Dementia Maternal Grandfather         My mom's Dad    Stroke Maternal Grandfather     Ovarian cancer Neg Hx     Uterine cancer Neg Hx     Colon cancer Neg Hx     Osteoporosis Neg Hx           Current Outpatient Medications:     folic acid (FOLVITE) 1 MG tablet, Take 2 tablets by mouth Daily., Disp: 180 tablet, Rfl: 4    levETIRAcetam XR (KEPPRA XR) 500 MG tablet, Take 1 tablet by mouth Daily., Disp: 90 tablet, Rfl: 4    medroxyPROGESTERone (PROVERA) 10 MG tablet, Take 1 tablet by mouth Daily., Disp: 10 tablet, Rfl: 11    SUMAtriptan (Imitrex) 50 MG tablet, Take one tablet at onset of headache. May repeat dose one time in 2 hours if headache not relieved., Disp: 27 tablet, Rfl: 4    vitamin D (ERGOCALCIFEROL) 1.25 MG (16998 UT) capsule capsule, Take 1 capsule by mouth 1 (One) Time Per Week., Disp: 12 capsule, Rfl: 1     Review of Systems   Neurological:  Positive for headaches. Negative for seizures.   All other systems reviewed and are negative.       Objective:  /78   Pulse 98   Ht 157.5 cm (62.01\")   Wt 97.3 kg (214 lb 8 oz)   SpO2 97%   BMI 39.22 kg/m²     Neurologic Exam     Mental Status   Oriented to person, place, and time.   Speech: speech is normal   Level of consciousness: alert    Cranial Nerves   Cranial nerves II through XII intact.     Motor Exam   Muscle bulk: normal  Overall muscle tone: normal    Strength   Strength 5/5 throughout.     Gait, Coordination, and Reflexes     Gait  Gait: normal    Coordination   Finger to nose coordination: normal    Tremor   Resting tremor: absent  Intention tremor: absent  Action tremor: absent    Reflexes   Right : 2+  Left : 2+      Physical Exam  Constitutional:       Appearance: Normal appearance. She is obese.      Comments: BMI 39.2   Neurological:      Mental Status: She is alert and oriented to person, place, and time.      Cranial Nerves: Cranial nerves 2-12 are intact.      Motor: Motor strength is normal.   "   Coordination: Finger-Nose-Finger Test normal.      Gait: Gait is intact.   Psychiatric:         Mood and Affect: Mood and affect normal.         Speech: Speech normal.         Assessment/Plan:       Diagnoses and all orders for this visit:    1. Seizure disorder  -     folic acid (FOLVITE) 1 MG tablet; Take 2 tablets by mouth Daily.  Dispense: 180 tablet; Refill: 4  -     levETIRAcetam XR (KEPPRA XR) 500 MG tablet; Take 1 tablet by mouth Daily.  Dispense: 90 tablet; Refill: 4    2. Migraine without aura and without status migrainosus, not intractable  -     SUMAtriptan (Imitrex) 50 MG tablet; Take one tablet at onset of headache. May repeat dose one time in 2 hours if headache not relieved.  Dispense: 27 tablet; Refill: 4    She is doing well overall, stable. I refilled her medications.     She will follow up in 1 year or sooner if needed.    Offered rescue nasal spray in the event of a seizure, but she kindly declines today. She has been seizure free for 9 years. If she were to have another seizure, we can prescribe this.     Reviewed medications, potential side effects and signs and symptoms to report. Discussed risk versus benefits of treatment plan with patient and/or family-including medications, labs and radiology that may be ordered. Addressed questions and concerns during visit. Patient and/or family verbalized understanding and agree with plan.    Patient instructions include: No driving or operating heavy machinery, solo bathing or tub baths for 90 days from onset of most recent seizure, if they currently hold a license. Minimize stress as much as possible. Recommended 7-8 hours of sleep each night. Abstain from alcohol intake. Educated on Antiepileptic medications with possible side effects and signs and symptoms to report if prescribed during visit. Instructed to take seizure medication daily if prescribed. Reviewed potential seizure risk factors. Instructed to call 911 or our office if another seizure  "does occur.    Sudden Unexpected Death in Epilepsy is defined as \"the sudden, unexpected, witnessed or unwitnessed, non-traumatic, and non-drowning death in patients with epilepsy with or without evidence for a seizure, and excluding documented status epilepticus, in which postmortem examination does not reveal a structural or toxicological cause for death.\"-Epilepsy Foundation 2021. SUDEP is most commonly seen in Epilepsy patient's who's seizures are not well controlled or who have seizures during their sleep. This condition is not fully understood. To reduce your risk of SUDEP, please take your seizure medications as prescribed, keep a diary of your seizures and when they occur and if your seizures are not well controlled, please notify us so we can collaborate with you to get your seizures better controlled. If you have additional questions, please visit: [email or web address] for more information.      During this visit the following were done:  Labs Reviewed [x]    Labs Ordered []    Radiology Reports Reviewed []    Radiology Ordered []    PCP Records Reviewed [x]    Referring Provider Records Reviewed []    ER Records Reviewed []    Hospital Records Reviewed []    History Obtained From Family []    Radiology Images Reviewed []    Other Reviewed []    Records Requested []      Transcribed from ambient dictation for JIMENEZ Morales by Magdalena Rosenthal.  01/09/24   12:50 EST    Patient or patient representative verbalized consent to the visit recording.  I have personally performed the services described in this document as transcribed by the above individual, and it is both accurate and complete.  JIMENEZ Morales  1/10/2024  07:43 EST      Note to patient: The 21st Century Cures Act makes medical notes like these available to patients in the interest of transparency. However, be advised this is a medical document. It is intended as peer to peer communication. It is written in medical language and " may contain abbreviations or verbiage that are unfamiliar. It may appear blunt or direct. Medical documents are intended to carry relevant information, facts as evident, and the clinical opinion of the provider.

## 2024-01-12 ENCOUNTER — PATIENT ROUNDING (BHMG ONLY) (OUTPATIENT)
Dept: NEUROLOGY | Facility: CLINIC | Age: 44
End: 2024-01-12
Payer: COMMERCIAL

## 2024-01-12 NOTE — PROGRESS NOTES
A My-Chart message has been sent to the patient [place] [facility] [place]

## 2024-01-26 RX ORDER — ERGOCALCIFEROL 1.25 MG/1
50000 CAPSULE ORAL WEEKLY
Qty: 12 CAPSULE | Refills: 1 | Status: SHIPPED | OUTPATIENT
Start: 2024-01-26

## 2024-02-26 DIAGNOSIS — N91.3 PRIMARY OLIGOMENORRHEA: ICD-10-CM

## 2024-02-26 RX ORDER — MEDROXYPROGESTERONE ACETATE 10 MG/1
10 TABLET ORAL DAILY
Qty: 10 TABLET | Refills: 11 | Status: SHIPPED | OUTPATIENT
Start: 2024-02-26 | End: 2024-02-27 | Stop reason: SDUPTHER

## 2024-02-27 ENCOUNTER — OFFICE VISIT (OUTPATIENT)
Dept: OBSTETRICS AND GYNECOLOGY | Facility: CLINIC | Age: 44
End: 2024-02-27
Payer: COMMERCIAL

## 2024-02-27 VITALS
HEIGHT: 62 IN | BODY MASS INDEX: 39.86 KG/M2 | DIASTOLIC BLOOD PRESSURE: 90 MMHG | WEIGHT: 216.6 LBS | SYSTOLIC BLOOD PRESSURE: 130 MMHG

## 2024-02-27 DIAGNOSIS — N91.3 PRIMARY OLIGOMENORRHEA: ICD-10-CM

## 2024-02-27 DIAGNOSIS — Z01.419 WELL WOMAN EXAM WITH ROUTINE GYNECOLOGICAL EXAM: Primary | ICD-10-CM

## 2024-02-27 DIAGNOSIS — Z12.31 SCREENING MAMMOGRAM FOR BREAST CANCER: ICD-10-CM

## 2024-02-27 DIAGNOSIS — E28.2 PCOS (POLYCYSTIC OVARIAN SYNDROME): ICD-10-CM

## 2024-02-27 RX ORDER — MEDROXYPROGESTERONE ACETATE 10 MG/1
10 TABLET ORAL DAILY
Qty: 10 TABLET | Refills: 11 | Status: SHIPPED | OUTPATIENT
Start: 2024-02-27

## 2024-02-27 NOTE — PROGRESS NOTES
Subjective   Chief Complaint   Patient presents with    Annual Exam     Well woman exam     Val Bloom is a 43 y.o. year old  presenting to be seen for her annual exam.   She had a BI-RADS 1 screening mammogram May 2023.  Her last Pap was in  with normal cytology and negative HPV cotesting.She take she takes Provera monthly for menses induction.  SEXUAL Hx:   She is not currently sexually active.  In the past year there she has not been sexually active.    Condoms are not needed because she is not sexually active.  She would not like to be screened for STD's at today's exam.  Current birth control method: abstinence.  She is happy with her current method of contraception and does not want to discuss alternative methods of contraception.  MENSTRUAL Hx:  Patient's last menstrual period was 2024 (approximate).  In the past 6 months her cycles have been regular, predictable and occur monthly with Provera use.  Her menstrual flow is typically normal.   Each month on average there are roughly 0 day(s) of very heavy flow.    Intermenstrual bleeding is absent.    Post-coital bleeding is n/a.  Dysmenorrhea: is not affecting her activities of daily living  PMS: is not affecting her activities of daily living  Her cycles are not a source of concern for her that she wishes to discuss today.  HEALTH Hx:  She exercises regularly: no (but is planning to start exercising more).  She wears her seat belt: yes.  She has concerns about domestic violence: no.  OTHER THINGS SHE WANTS TO DISCUSS TODAY:  Nothing else    The following portions of the patient's history were reviewed and updated as appropriate:problem list, current medications, allergies, past family history, past medical history, past social history, and past surgical history.    Social History    Tobacco Use      Smoking status: Never      Smokeless tobacco: Never    Review of Systems  Constitutional POS: nothing reported    NEG: anorexia or night  "sweats   Genitourinary POS: nothing reported    NEG: dysuria or hematuria      Gastointestinal POS: nothing reported    NEG: bloating, change in bowel habits, melena, or reflux symptoms   Integument POS: nothing reported    NEG: moles that are changing in size, shape, color or rashes   Breast POS: nothing reported    NEG: persistent breast lump, skin dimpling, or nipple discharge        Objective   /90 (BP Location: Left arm, Patient Position: Sitting, Cuff Size: Adult)   Ht 157.5 cm (62\")   Wt 98.2 kg (216 lb 9.6 oz)   LMP 02/11/2024 (Approximate)   BMI 39.62 kg/m²     General:  well developed; well nourished  no acute distress  obese - Body mass index is 39.62 kg/m².   Skin:  No suspicious lesions seen dark coarse facial hair noted   Thyroid: normal to inspection and palpation   Breasts:  Examined in supine position  Symmetric without masses or skin dimpling  Nipples normal without inversion, lesions or discharge  There are no palpable axillary nodes  Are care noted in the left areola region   Abdomen: soft, non-tender; no masses  no umbilical or inguinal hernias are present  no hepato-splenomegaly   Pelvis: Clinical staff was present for exam  :  urethral meatus normal;  Vaginal:  normal pink mucosa without prolapse or lesions.  Cervix:  normal appearance.  Uterus:  normal size, shape and consistency.  Adnexa:  normal bimanual exam of the adnexa.  Rectal:  digital rectal exam not performed; anus visually normal appearing.  Exam limited by patient body habitus and discomfort with exam.        Assessment   Well woman with routine gynecological exam  Oligomenorrhea-on Provera monthly for menses induction  PCOS     Plan     Pap was done today.  If she does not receive the results of the Pap within 2 weeks  time, she was instructed to call to find out the results.  I explained to Val that the recommendations for Pap smear interval in a low risk patient's has lengthened to 3 years time.  I encouraged her " to be seen yearly for a full physical exam including breast and pelvic exam even during the off years when PAP's will not be performed.  She was encouraged to get yearly mammograms.  She should report any palpable breast lump(s) or skin changes regardless of mammographic findings.  I explained to Val that notification regarding her mammogram results will come from the center performing the study.  Our office will not be routinely calling with mammogram results.  It is her responsibility to make sure that the results from the mammogram are communicated to her by the breast center.  If she has any questions about the results, she is welcome to call our office anytime.  Prescription(s) for provera  were refilled today   The importance of keeping all planned follow-up and taking all medications as prescribed was emphasized.  Today I discussed with Val the total recommended calcium intake for a premenopausal female is 1000 mg.  Ideally this should be from dietary sources.  I reviewed calcium content in various foods including milk, fortified orange juice and yogurt.  If she cannot get sufficient calcium through dietary means, it is recommended to supplement with either a multivitamin or calcium to reach her daily goal.  I also reviewed the difference in the bioavailability of calcium carbonate and calcium citrate containing supplements and the importance of taking calcium carbonate containing products with food.  Finally, vitamin D's role in calcium absorption was reviewed and a total daily vitamin D intake of 800 units was recommended.  Follow up for annual exam 1 year or prn    New Medications Ordered This Visit   Medications    medroxyPROGESTERone (PROVERA) 10 MG tablet     Sig: Take 1 tablet by mouth Daily.     Dispense:  10 tablet     Refill:  11          This note was electronically signed.    Ashley Spaulding, JIMENEZ  February 27, 2024

## 2024-03-04 LAB — REF LAB TEST METHOD: NORMAL

## 2024-03-06 ENCOUNTER — TELEPHONE (OUTPATIENT)
Dept: OBSTETRICS AND GYNECOLOGY | Facility: CLINIC | Age: 44
End: 2024-03-06
Payer: COMMERCIAL

## 2024-03-13 DIAGNOSIS — G40.909 SEIZURE DISORDER: ICD-10-CM

## 2024-03-13 RX ORDER — LEVETIRACETAM 500 MG/1
500 TABLET, FILM COATED, EXTENDED RELEASE ORAL DAILY
Qty: 90 TABLET | Refills: 4 | OUTPATIENT
Start: 2024-03-13

## 2024-03-24 DIAGNOSIS — G40.909 SEIZURE DISORDER: ICD-10-CM

## 2024-03-25 RX ORDER — LEVETIRACETAM 500 MG/1
500 TABLET, FILM COATED, EXTENDED RELEASE ORAL DAILY
Qty: 90 TABLET | Refills: 4 | OUTPATIENT
Start: 2024-03-25

## 2024-04-04 ENCOUNTER — TRANSCRIBE ORDERS (OUTPATIENT)
Dept: ADMINISTRATIVE | Facility: HOSPITAL | Age: 44
End: 2024-04-04
Payer: COMMERCIAL

## 2024-04-04 DIAGNOSIS — Z12.31 VISIT FOR SCREENING MAMMOGRAM: Primary | ICD-10-CM

## 2024-04-24 DIAGNOSIS — G40.909 SEIZURE DISORDER: ICD-10-CM

## 2024-04-24 RX ORDER — FOLIC ACID 1 MG/1
2 TABLET ORAL DAILY
Qty: 180 TABLET | Refills: 4 | OUTPATIENT
Start: 2024-04-24

## 2024-05-28 ENCOUNTER — HOSPITAL ENCOUNTER (OUTPATIENT)
Dept: MAMMOGRAPHY | Facility: HOSPITAL | Age: 44
Discharge: HOME | End: 2024-05-28
Admitting: NURSE PRACTITIONER
Payer: COMMERCIAL

## 2024-05-28 DIAGNOSIS — Z12.31 VISIT FOR SCREENING MAMMOGRAM: ICD-10-CM

## 2024-05-28 PROCEDURE — 77063 BREAST TOMOSYNTHESIS BI: CPT

## 2024-05-28 PROCEDURE — 77067 SCR MAMMO BI INCL CAD: CPT

## 2024-06-10 ENCOUNTER — HOSPITAL ENCOUNTER (OUTPATIENT)
Dept: MAMMOGRAPHY | Facility: HOSPITAL | Age: 44
Discharge: HOME | End: 2024-06-10
Payer: COMMERCIAL

## 2024-06-10 DIAGNOSIS — Z12.31 VISIT FOR SCREENING MAMMOGRAM: ICD-10-CM

## 2024-10-31 ENCOUNTER — OFFICE VISIT (OUTPATIENT)
Dept: INTERNAL MEDICINE | Facility: CLINIC | Age: 44
End: 2024-10-31
Payer: COMMERCIAL

## 2024-10-31 VITALS
HEART RATE: 87 BPM | BODY MASS INDEX: 40.15 KG/M2 | WEIGHT: 218.2 LBS | TEMPERATURE: 97.7 F | HEIGHT: 62 IN | OXYGEN SATURATION: 98 % | DIASTOLIC BLOOD PRESSURE: 80 MMHG | SYSTOLIC BLOOD PRESSURE: 114 MMHG

## 2024-10-31 DIAGNOSIS — Z13.21 ENCOUNTER FOR VITAMIN DEFICIENCY SCREENING: ICD-10-CM

## 2024-10-31 DIAGNOSIS — G43.009 MIGRAINE WITHOUT AURA AND WITHOUT STATUS MIGRAINOSUS, NOT INTRACTABLE: ICD-10-CM

## 2024-10-31 DIAGNOSIS — Z00.00 WELLNESS EXAMINATION: Primary | ICD-10-CM

## 2024-10-31 DIAGNOSIS — Z13.220 LIPID SCREENING: ICD-10-CM

## 2024-10-31 DIAGNOSIS — E55.9 VITAMIN D DEFICIENCY: ICD-10-CM

## 2024-10-31 DIAGNOSIS — Z13.0 SCREENING FOR BLOOD DISEASE: ICD-10-CM

## 2024-10-31 DIAGNOSIS — E78.00 HYPERCHOLESTEREMIA: ICD-10-CM

## 2024-10-31 DIAGNOSIS — Z13.29 THYROID DISORDER SCREENING: ICD-10-CM

## 2024-10-31 DIAGNOSIS — Z13.1 SCREENING FOR DIABETES MELLITUS: ICD-10-CM

## 2024-10-31 DIAGNOSIS — G40.909 SEIZURE DISORDER: ICD-10-CM

## 2024-10-31 PROCEDURE — 99396 PREV VISIT EST AGE 40-64: CPT | Performed by: NURSE PRACTITIONER

## 2024-10-31 NOTE — PROGRESS NOTES
Subjective   Chief Complaint   Patient presents with    Annual Exam       aVl Bloom is a 44 y.o. female here today for annual exam.    History of Present Illness  The patient presents for a physical exam.    She is currently on Keppra and has not experienced any seizures. She has Imitrex available for headaches but has not needed to use it.     She is also taking folic acid and medroxyprogesterone monthly to regulate her menstrual cycle. She reports feeling healthy overall and has attempted to incorporate exercise into her routine, specifically walking. She is making an effort to increase her water intake.    She reports no urinary issues and performs self-breast exams. She has never undergone a colonoscopy and reports no issues with bowel movements. She recently visited the dentist and plans to schedule an eye exam once her 's new insurance takes effect in December 2024.    She reports no difficulty swallowing, shortness of breath, or chest pain. She has not noticed any weakness on one side of her body compared to the other, nor any leg swelling. She experienced some abdominal pain recently, but it has since resolved. She has not noticed any concerning moles or skin lesions.    SOCIAL HISTORY  She drinks alcohol rarely. She does not smoke or vape.    FAMILY HISTORY  She has 2 aunts who had breast cancer. She denies any family history of colon cancer.    IMMUNIZATIONS  She is up to date on her immunizations.    Overall healthy:  Yes  Regular exercise:  No  Diet is well balanced:  No and Improving  Vitamin Supplement:  folic acid  Alcohol intake:  Rare  Tobacco use:  No  Cardiovascular risk is low:  Low  Menstrual cycle regular:  Irregular and seeing gynecology  PAP:  Up to date and Normal  :  No urinary issues  Mammogram:  Up to date and FH, aunts with breast cancer.   Colonoscopy:  N/A and No FH  GI:  Normal BM  Regular dental exam:  Up to date  Regular eye exam:  Due and Will  self-schedule  Immunizations up to date:  educated covid and flu.   Wear a seatbelt regularly:  Yes  Wear sunscreen regularly when outdoors:  Yes    The 10-year ASCVD risk score (Ok PECK, et al., 2019) is: 0.8%    Values used to calculate the score:      Age: 44 years      Sex: Female      Is Non- : No      Diabetic: No      Tobacco smoker: No      Systolic Blood Pressure: 114 mmHg      Is BP treated: No      HDL Cholesterol: 42 mg/dL      Total Cholesterol: 200 mg/dL     Past Medical History:   Diagnosis Date    Aspiration pneumonia 09/15/2016    Chronic left-sided low back pain with left-sided sciatica     Hypercholesteremia 11/4/2024    Migraine without aura     Seizures     first grade initial and then 2nd in 2015     Past Surgical History:   Procedure Laterality Date    NO PAST SURGERIES       Family History   Problem Relation Age of Onset    Alcohol abuse Mother     Alzheimer's disease Mother     Stroke Mother     Seizures Mother         Epilepsy    Hypertension Mother     Cancer Mother         History of malignant neoplasm    Migraines Mother     Breast cancer Paternal Aunt     Dementia Maternal Grandfather         My mom's Dad    Stroke Maternal Grandfather     Ovarian cancer Neg Hx     Uterine cancer Neg Hx     Colon cancer Neg Hx     Osteoporosis Neg Hx      Social History     Tobacco Use   Smoking Status Never    Passive exposure: Never   Smokeless Tobacco Never      Social History     Substance and Sexual Activity   Alcohol Use Not Currently    Comment: Occasional      No Known Allergies    Review of Systems  Pertinent items are noted in HPI.     Current Outpatient Medications on File Prior to Visit   Medication Sig    folic acid (FOLVITE) 1 MG tablet Take 2 tablets by mouth Daily.    levETIRAcetam XR (KEPPRA XR) 500 MG tablet Take 1 tablet by mouth Daily.    medroxyPROGESTERone (PROVERA) 10 MG tablet Take 1 tablet by mouth Daily.    SUMAtriptan (Imitrex) 50 MG tablet Take one  "tablet at onset of headache. May repeat dose one time in 2 hours if headache not relieved.    vitamin D (ERGOCALCIFEROL) 1.25 MG (49301 UT) capsule capsule TAKE 1 CAPSULE BY MOUTH 1 TIME EVERY WEEK     No current facility-administered medications on file prior to visit.       Objective   Vitals:    10/31/24 1536   BP: 114/80   BP Location: Right arm   Patient Position: Sitting   Cuff Size: Large Adult   Pulse: 87   Temp: 97.7 °F (36.5 °C)   SpO2: 98%   Weight: 99 kg (218 lb 3.2 oz)   Height: 157.5 cm (62.01\")     Body mass index is 39.9 kg/m².    Physical Exam  Vitals reviewed.   Constitutional:       Appearance: Normal appearance. She is well-developed.   HENT:      Head: Normocephalic and atraumatic.      Right Ear: Hearing, tympanic membrane, ear canal and external ear normal.      Left Ear: Hearing, tympanic membrane, ear canal and external ear normal.      Nose: Nose normal.      Mouth/Throat:      Lips: Pink.      Mouth: Mucous membranes are moist.      Pharynx: Oropharynx is clear. Uvula midline.   Eyes:      General: Lids are normal.      Extraocular Movements: Extraocular movements intact.      Conjunctiva/sclera: Conjunctivae normal.      Pupils: Pupils are equal, round, and reactive to light.   Neck:      Thyroid: No thyromegaly.      Trachea: Trachea normal.   Cardiovascular:      Rate and Rhythm: Normal rate and regular rhythm.      Pulses:           Carotid pulses are 2+ on the right side and 2+ on the left side.     Heart sounds: Normal heart sounds.   Pulmonary:      Effort: Pulmonary effort is normal. No respiratory distress.      Breath sounds: Normal breath sounds.   Abdominal:      General: Bowel sounds are normal.      Palpations: Abdomen is soft.      Tenderness: There is no abdominal tenderness.   Skin:     General: Skin is warm and dry.      Capillary Refill: Capillary refill takes 2 to 3 seconds.   Neurological:      Mental Status: She is alert and oriented to person, place, and time.      " GCS: GCS eye subscore is 4. GCS verbal subscore is 5. GCS motor subscore is 6.   Psychiatric:         Attention and Perception: Attention normal.         Mood and Affect: Mood normal.         Speech: Speech normal.         Behavior: Behavior normal. Behavior is cooperative.         Thought Content: Thought content normal.         Results  Lab Results   Component Value Date    WBC 7.35 10/26/2023    HGB 14.6 10/26/2023    HCT 42.2 10/26/2023    MCV 92.7 10/26/2023     10/26/2023     Lab Results   Component Value Date    GLUCOSE 79 10/26/2023    BUN 18 10/26/2023    CREATININE 0.77 10/26/2023     10/26/2023    K 4.0 10/26/2023     10/26/2023    CALCIUM 8.9 10/26/2023    PROTEINTOT 7.0 10/26/2023    ALBUMIN 4.2 10/26/2023    ALT 23 10/26/2023    AST 22 10/26/2023    ALKPHOS 71 10/26/2023    BILITOT 0.3 10/26/2023    GLOB 2.8 10/26/2023    AGRATIO 1.5 10/26/2023    BCR 23.4 10/26/2023    ANIONGAP 11.4 10/26/2023    EGFR 99.5 10/26/2023     Lab Results   Component Value Date    CHOL 200 10/26/2023    TRIG 110 10/26/2023    HDL 42 10/26/2023     (H) 10/26/2023         Class 2 Severe Obesity (BMI >=35 and <=39.9). Obesity-related health conditions include the following: dyslipidemias. Obesity is worsening. BMI is is above average; BMI management plan is completed. We discussed portion control and increasing exercise.        Assessment & Plan     Current Outpatient Medications:     folic acid (FOLVITE) 1 MG tablet, Take 2 tablets by mouth Daily., Disp: 180 tablet, Rfl: 4    levETIRAcetam XR (KEPPRA XR) 500 MG tablet, Take 1 tablet by mouth Daily., Disp: 90 tablet, Rfl: 4    medroxyPROGESTERone (PROVERA) 10 MG tablet, Take 1 tablet by mouth Daily., Disp: 10 tablet, Rfl: 11    SUMAtriptan (Imitrex) 50 MG tablet, Take one tablet at onset of headache. May repeat dose one time in 2 hours if headache not relieved., Disp: 27 tablet, Rfl: 4    vitamin D (ERGOCALCIFEROL) 1.25 MG (24527 UT) capsule capsule,  TAKE 1 CAPSULE BY MOUTH 1 TIME EVERY WEEK, Disp: 12 capsule, Rfl: 1    Problem List Items Addressed This Visit       Seizure disorder    Migraine without aura    Hypercholesteremia     Other Visit Diagnoses       Wellness examination    -  Primary    Relevant Orders    CBC (No Diff)    Comprehensive Metabolic Panel    Lipid Panel    Hemoglobin A1c    TSH Rfx On Abnormal To Free T4    Vitamin B12 & Folate    Vitamin D,25-Hydroxy    Screening for blood disease        Relevant Orders    CBC (No Diff)    Comprehensive Metabolic Panel    Lipid Panel    Hemoglobin A1c    TSH Rfx On Abnormal To Free T4    Vitamin B12 & Folate    Vitamin D,25-Hydroxy    Thyroid disorder screening        Relevant Orders    TSH Rfx On Abnormal To Free T4    Lipid screening        Relevant Orders    Lipid Panel    Encounter for vitamin deficiency screening        Relevant Orders    Vitamin B12 & Folate    Vitamin D,25-Hydroxy    Screening for diabetes mellitus        Relevant Orders    Hemoglobin A1c    Vitamin D deficiency        Relevant Orders    Vitamin D,25-Hydroxy            Assessment & Plan  Wellness exam  Patient will continue to eat a well-balanced diet, drink adequate amount of water, exercise at least 3 times weekly, and get adequate rest.  Suggested a multivitamin daily.  Discussed future preventative screenings and immunizations.    Follow low cholesterol and high fiber diet. Continue keppra daily for seizures. Continue imitrex for migraines and continue follow ups with neurology. Continue provera for menstrual cycles per gynecology. Continue folic acid and vitamin D supplements. Patient will come by for yearly screening blood work.                Counseling was given to patient for the following topics: appropriate exercise, healthy eating habits, disease prevention, risk factors for cancer, importance of self breast exam and breast health, importance of immunizations, including risks and benefits, bone health, sun safety, and  seatbelt use.      Plan of care reviewed with the patient at the conclusion of today's visit.  Education was provided regarding diagnosis, management, and any prescribed or recommended OTC medications.  Patient verbalized understanding of and agreement with management plan.     Return if symptoms worsen or fail to improve.      Transcribed from ambient dictation for JIMENEZ Rios by JIMENEZ Rios.  10/31/24   16:01 EDT    Patient or patient representative verbalized consent for the use of Ambient Listening during the visit with  JIMENEZ Rios for chart documentation. 11/4/2024  22:21 EST

## 2024-11-04 PROBLEM — E78.00 HYPERCHOLESTEREMIA: Status: ACTIVE | Noted: 2024-11-04

## 2024-11-07 ENCOUNTER — LAB (OUTPATIENT)
Dept: INTERNAL MEDICINE | Facility: CLINIC | Age: 44
End: 2024-11-07
Payer: COMMERCIAL

## 2024-11-07 DIAGNOSIS — E55.9 VITAMIN D DEFICIENCY: ICD-10-CM

## 2024-11-07 DIAGNOSIS — Z13.0 SCREENING FOR BLOOD DISEASE: ICD-10-CM

## 2024-11-07 DIAGNOSIS — Z13.21 ENCOUNTER FOR VITAMIN DEFICIENCY SCREENING: ICD-10-CM

## 2024-11-07 DIAGNOSIS — Z13.29 THYROID DISORDER SCREENING: ICD-10-CM

## 2024-11-07 DIAGNOSIS — Z00.00 WELLNESS EXAMINATION: ICD-10-CM

## 2024-11-07 DIAGNOSIS — Z13.220 LIPID SCREENING: ICD-10-CM

## 2024-11-07 DIAGNOSIS — Z13.1 SCREENING FOR DIABETES MELLITUS: ICD-10-CM

## 2024-11-07 LAB
25(OH)D3 SERPL-MCNC: 22 NG/ML (ref 30–100)
ALBUMIN SERPL-MCNC: 3.9 G/DL (ref 3.5–5.2)
ALBUMIN/GLOB SERPL: 1.1 G/DL
ALP SERPL-CCNC: 63 U/L (ref 39–117)
ALT SERPL W P-5'-P-CCNC: 22 U/L (ref 1–33)
ANION GAP SERPL CALCULATED.3IONS-SCNC: 11.4 MMOL/L (ref 5–15)
AST SERPL-CCNC: 19 U/L (ref 1–32)
BILIRUB SERPL-MCNC: 0.6 MG/DL (ref 0–1.2)
BUN SERPL-MCNC: 16 MG/DL (ref 6–20)
BUN/CREAT SERPL: 16.2 (ref 7–25)
CALCIUM SPEC-SCNC: 9.1 MG/DL (ref 8.6–10.5)
CHLORIDE SERPL-SCNC: 102 MMOL/L (ref 98–107)
CHOLEST SERPL-MCNC: 197 MG/DL (ref 0–200)
CO2 SERPL-SCNC: 21.6 MMOL/L (ref 22–29)
CREAT SERPL-MCNC: 0.99 MG/DL (ref 0.57–1)
DEPRECATED RDW RBC AUTO: 40.8 FL (ref 37–54)
EGFRCR SERPLBLD CKD-EPI 2021: 73.2 ML/MIN/1.73
ERYTHROCYTE [DISTWIDTH] IN BLOOD BY AUTOMATED COUNT: 12.1 % (ref 12.3–15.4)
FOLATE SERPL-MCNC: >20 NG/ML (ref 4.78–24.2)
GLOBULIN UR ELPH-MCNC: 3.5 GM/DL
GLUCOSE SERPL-MCNC: 93 MG/DL (ref 65–99)
HBA1C MFR BLD: 5.6 % (ref 4.8–5.6)
HCT VFR BLD AUTO: 45 % (ref 34–46.6)
HDLC SERPL-MCNC: 33 MG/DL (ref 40–60)
HGB BLD-MCNC: 15.4 G/DL (ref 12–15.9)
LDLC SERPL CALC-MCNC: 134 MG/DL (ref 0–100)
LDLC/HDLC SERPL: 3.96 {RATIO}
MCH RBC QN AUTO: 31.2 PG (ref 26.6–33)
MCHC RBC AUTO-ENTMCNC: 34.2 G/DL (ref 31.5–35.7)
MCV RBC AUTO: 91.3 FL (ref 79–97)
PLATELET # BLD AUTO: 201 10*3/MM3 (ref 140–450)
PMV BLD AUTO: 9.7 FL (ref 6–12)
POTASSIUM SERPL-SCNC: 4.4 MMOL/L (ref 3.5–5.2)
PROT SERPL-MCNC: 7.4 G/DL (ref 6–8.5)
RBC # BLD AUTO: 4.93 10*6/MM3 (ref 3.77–5.28)
SODIUM SERPL-SCNC: 135 MMOL/L (ref 136–145)
TRIGL SERPL-MCNC: 167 MG/DL (ref 0–150)
TSH SERPL DL<=0.05 MIU/L-ACNC: 1.96 UIU/ML (ref 0.27–4.2)
VIT B12 BLD-MCNC: 570 PG/ML (ref 211–946)
VLDLC SERPL-MCNC: 30 MG/DL (ref 5–40)
WBC NRBC COR # BLD AUTO: 6.64 10*3/MM3 (ref 3.4–10.8)

## 2024-11-07 PROCEDURE — 82306 VITAMIN D 25 HYDROXY: CPT | Performed by: NURSE PRACTITIONER

## 2024-11-07 PROCEDURE — 83036 HEMOGLOBIN GLYCOSYLATED A1C: CPT | Performed by: NURSE PRACTITIONER

## 2024-11-07 PROCEDURE — 82607 VITAMIN B-12: CPT | Performed by: NURSE PRACTITIONER

## 2024-11-07 PROCEDURE — 80061 LIPID PANEL: CPT | Performed by: NURSE PRACTITIONER

## 2024-11-07 PROCEDURE — 80053 COMPREHEN METABOLIC PANEL: CPT | Performed by: NURSE PRACTITIONER

## 2024-11-07 PROCEDURE — 36415 COLL VENOUS BLD VENIPUNCTURE: CPT | Performed by: NURSE PRACTITIONER

## 2024-11-07 PROCEDURE — 82746 ASSAY OF FOLIC ACID SERUM: CPT | Performed by: NURSE PRACTITIONER

## 2024-11-07 PROCEDURE — 85027 COMPLETE CBC AUTOMATED: CPT | Performed by: NURSE PRACTITIONER

## 2024-11-07 PROCEDURE — 84443 ASSAY THYROID STIM HORMONE: CPT | Performed by: NURSE PRACTITIONER

## 2024-11-13 RX ORDER — ERGOCALCIFEROL 1.25 MG/1
50000 CAPSULE, LIQUID FILLED ORAL WEEKLY
Qty: 12 CAPSULE | Refills: 1 | Status: SHIPPED | OUTPATIENT
Start: 2024-11-13

## 2025-01-08 DIAGNOSIS — G40.909 SEIZURE DISORDER: ICD-10-CM

## 2025-01-08 RX ORDER — FOLIC ACID 1 MG/1
2 TABLET ORAL DAILY
Qty: 180 TABLET | Refills: 4 | OUTPATIENT
Start: 2025-01-08

## 2025-01-08 NOTE — TELEPHONE ENCOUNTER
Script sent to this pharmacy on 1/2024 with 4 refills, # of 180 per fill.  Pt should have enough to last until 4/2025.

## 2025-01-13 ENCOUNTER — OFFICE VISIT (OUTPATIENT)
Dept: NEUROLOGY | Facility: CLINIC | Age: 45
End: 2025-01-13
Payer: COMMERCIAL

## 2025-01-13 VITALS
OXYGEN SATURATION: 96 % | HEART RATE: 96 BPM | HEIGHT: 62 IN | SYSTOLIC BLOOD PRESSURE: 122 MMHG | BODY MASS INDEX: 40.01 KG/M2 | WEIGHT: 217.4 LBS | DIASTOLIC BLOOD PRESSURE: 80 MMHG

## 2025-01-13 DIAGNOSIS — G43.009 MIGRAINE WITHOUT AURA AND WITHOUT STATUS MIGRAINOSUS, NOT INTRACTABLE: ICD-10-CM

## 2025-01-13 DIAGNOSIS — G40.909 SEIZURE DISORDER: Primary | ICD-10-CM

## 2025-01-13 PROCEDURE — 99213 OFFICE O/P EST LOW 20 MIN: CPT | Performed by: NURSE PRACTITIONER

## 2025-01-13 RX ORDER — LEVETIRACETAM 500 MG/1
500 TABLET, FILM COATED, EXTENDED RELEASE ORAL DAILY
Qty: 90 TABLET | Refills: 4 | Status: SHIPPED | OUTPATIENT
Start: 2025-01-13

## 2025-01-13 RX ORDER — FOLIC ACID 1 MG/1
2 TABLET ORAL DAILY
Qty: 180 TABLET | Refills: 4 | Status: SHIPPED | OUTPATIENT
Start: 2025-01-13

## 2025-01-13 NOTE — LETTER
January 13, 2025     JIMENEZ Rasmussen  [address] [id number]    Patient: Val Bloom   YOB: 1980   Date of Visit: 1/13/2025       Dear JIMENEZ Rasmussen    Val Bloom was in my office today. Below is a copy of my note.    If you have questions, please do not hesitate to call me. I look forward to following Val along with you.         Sincerely,        JIMENEZ Morales        CC: No Recipients    Subjective:     Patient ID: Val Bloom is a 44 y.o. female.    CC:   Chief Complaint   Patient presents with   • Seizures       HPI:   History of Present Illness  This is a 44-year-old female who presents for a 1-year neurology follow-up on seizures and migraines. She was last seen on 01/09/2024. She has had two total seizure episodes, one in first grade and another in 2015. She is currently on levetiracetam  mg daily for seizure prevention. She is on sumatriptan as needed for migraines and folic acid 2 tablets daily. She is completing a 1-year follow-up today. We have discussed rescue seizure medication; however, she has preferred to wait on this prescription. Her cholesterol is being monitored at this time by PCP.    She reports no significant changes in her health status since her last visit. She has not required any surgical interventions or hospital admissions. She has been seizure-free since 2015. She has not experienced any migraines in 18 months since switching jobs. She continues to take folic acid daily and requires a refill of this medication. She prefers to remain on her Keppra long term.    FAMILY HISTORY  Her mother has epilepsy.    Prior Neurological History & Workup:  Seizure disorder and migraines. She had 2 lifetime seizure episodes, once in first grade and then once in 2015. She has been well controlled on levetiracetam  mg daily. She also takes folic acid 2 mg daily. She does eventually plan to have  children.      She had an EEG in 2016, which was normal. Previous MRI of the brain in 2016 showed several small bifrontal gliotic foci felt to be related to migraines. Migraines have generally been just a few times per month. Her mother also has epilepsy and migraines. The seizure she had as a child was a generalized convulsive seizure. The second seizure she had was witnessed by her  and was in the night. She would not respond to him and was unable to speak and was staring.      Previously followed in clinic long term by Dr. Adrián Pringle, neurology.      Follows with GYN.     Episodic and rare migraines.     She has been having irregular menstrual cycles since 2020. She is still taking folic acid and medroxyprogesterone. She denies any pregnancy.      Migraines have responded well to sumatriptan in past.    The following portions of the patient's history were reviewed and updated as appropriate: allergies, current medications, past family history, past medical history, past social history, past surgical history, and problem list.    Past Medical History:   Diagnosis Date   • Aspiration pneumonia 09/15/2016   • Chronic left-sided low back pain with left-sided sciatica    • Hypercholesteremia 11/4/2024   • Migraine without aura    • Seizures     first grade initial and then 2nd in 2015       Past Surgical History:   Procedure Laterality Date   • NO PAST SURGERIES         Social History     Socioeconomic History   • Marital status:    • Number of children: 0   • Highest education level: High school graduate   Tobacco Use   • Smoking status: Never     Passive exposure: Never   • Smokeless tobacco: Never   Vaping Use   • Vaping status: Never Used   Substance and Sexual Activity   • Alcohol use: Not Currently     Comment: Occasional   • Drug use: No   • Sexual activity: Not Currently     Partners: Male     Birth control/protection: None       Family History   Problem Relation Age of Onset   • Alcohol  "abuse Mother    • Alzheimer's disease Mother    • Stroke Mother    • Seizures Mother         Epilepsy   • Hypertension Mother    • Cancer Mother         History of malignant neoplasm   • Migraines Mother    • Breast cancer Paternal Aunt    • Dementia Maternal Grandfather         My mom's Dad   • Stroke Maternal Grandfather    • Ovarian cancer Neg Hx    • Uterine cancer Neg Hx    • Colon cancer Neg Hx    • Osteoporosis Neg Hx           Current Outpatient Medications:   •  folic acid (FOLVITE) 1 MG tablet, Take 2 tablets by mouth Daily., Disp: 180 tablet, Rfl: 4  •  levETIRAcetam XR (KEPPRA XR) 500 MG tablet, Take 1 tablet by mouth Daily., Disp: 90 tablet, Rfl: 4  •  medroxyPROGESTERone (PROVERA) 10 MG tablet, Take 1 tablet by mouth Daily., Disp: 10 tablet, Rfl: 11  •  vitamin D (ERGOCALCIFEROL) 1.25 MG (60710 UT) capsule capsule, Take 1 capsule by mouth 1 (One) Time Per Week., Disp: 12 capsule, Rfl: 1     Review of Systems   Neurological:  Negative for seizures and headaches.   All other systems reviewed and are negative.       Objective:  /80   Pulse 96   Ht 157.5 cm (62\")   Wt 98.6 kg (217 lb 6.4 oz)   SpO2 96%   BMI 39.76 kg/m²     Neurological Exam  Mental Status  Alert. Oriented to person, place, time and situation. Speech is normal. Fund of knowledge is appropriate for level of education.    Cranial Nerves  CN II: Visual acuity is normal. Visual fields full to confrontation.  CN III, IV, VI: Extraocular movements intact bilaterally. Normal lids and orbits bilaterally. Pupils equal round and reactive to light bilaterally.  CN V: Facial sensation is normal.  CN VII: Full and symmetric facial movement.  CN IX, X: Palate elevates symmetrically. Normal gag reflex.  CN XI: Shoulder shrug strength is normal.  CN XII: Tongue midline without atrophy or fasciculations.    Motor  Normal muscle bulk throughout. Strength is 5/5 throughout all four extremities.    Reflexes  Deep tendon reflexes are 2+ and " symmetric in all four extremities.    Coordination    Finger-to-nose, rapid alternating movements and heel-to-shin normal bilaterally without dysmetria.    Gait  Normal casual, toe, heel and tandem gait.      Physical Exam  Constitutional:       Appearance: Normal appearance.   Eyes:      General: Lids are normal.      Extraocular Movements: Extraocular movements intact.      Pupils: Pupils are equal, round, and reactive to light.   Neurological:      Mental Status: She is alert.      Motor: Motor strength is normal.     Coordination: Coordination is intact.      Deep Tendon Reflexes: Reflexes are normal and symmetric.   Psychiatric:         Mood and Affect: Mood and affect normal.         Speech: Speech normal.         Results:  Results  Laboratory Studies  Labs with primary care were completed on 11/07/2024. Vitamin D level is low at 22. Vitamin B12 is 570, folate is greater than 20. TSH is 1.960, which is normal. Hemoglobin A1c is 5.6%. Lipid panel shows total cholesterol of 197, elevated triglycerides at 167, low HDL at 33 and LDL at 134. Comprehensive metabolic panel shows sodium at 135, CO2 at 21.6, otherwise normal. CBC without differential is essentially unremarkable.    Assessment/Plan:     Diagnoses and all orders for this visit:    1. Seizure disorder (Primary)  -     levETIRAcetam XR (KEPPRA XR) 500 MG tablet; Take 1 tablet by mouth Daily.  Dispense: 90 tablet; Refill: 4  -     folic acid (FOLVITE) 1 MG tablet; Take 2 tablets by mouth Daily.  Dispense: 180 tablet; Refill: 4    2. Migraine without aura and without status migrainosus, not intractable  Comments:  non in 18 months, no longer needs imitrex           Assessment & Plan  1. Epileptic seizures.  She has had two seizure episodes, one in first grade and another in 2015. She is currently on levetiracetam  mg daily for seizure prevention and prefers to continue this medication long-term. She has had no seizures since starting this regimen. A  "prescription refill for levetiracetam  mg daily has been provided to [facility] in [place]. Offered to complete EEG and potentially wean off Keppra XR, however, with her mom's history of Epilepsy, it was decided to continue her medications unchanged.    2. Migraines.  She is on sumatriptan as needed for migraines but has not experienced any migraines in the past 1.5 years. Sumatriptan has been discontinued due to the absence of migraines. If migraines recur, she can contact the office for a refill.    3. Vitamin D deficiency.  Her vitamin D levels remain low at 22 despite previous interventions. She is advised to try a vitamin D3 with K2 supplement, which may enhance absorption and improve her levels.    4. Medication management.  She is currently taking folic acid 2 tablets daily and requires a refill. [facility] in [place].    Follow-up  The patient will follow up in 1 year or sooner if needed.    Reviewed medications, potential side effects and signs and symptoms to report. Discussed risk versus benefits of treatment plan with patient and/or family-including medications, labs and radiology that may be ordered. Addressed questions and concerns during visit. Patient and/or family verbalized understanding and agree with plan.    Patient instructions include: No driving or operating heavy machinery, solo bathing or tub baths for 90 days from onset of most recent seizure, if they currently hold a license. Minimize stress as much as possible. Recommended 7-8 hours of sleep each night. Abstain from alcohol intake. Educated on Antiepileptic medications with possible side effects and signs and symptoms to report if prescribed during visit. Instructed to take seizure medication daily if prescribed. Reviewed potential seizure risk factors. Instructed to call 911 or our office if another seizure does occur.    Sudden Unexpected Death in Epilepsy is defined as \"the sudden, unexpected, " "witnessed or unwitnessed, non-traumatic, and non-drowning death in patients with epilepsy with or without evidence for a seizure, and excluding documented status epilepticus, in which postmortem examination does not reveal a structural or toxicological cause for death.\"-Epilepsy Foundation 2021. SUDEP is most commonly seen in Epilepsy patient's who's seizures are not well controlled or who have seizures during their sleep. This condition is not fully understood. To reduce your risk of SUDEP, please take your seizure medications as prescribed, keep a diary of your seizures and when they occur and if your seizures are not well controlled, please notify us so we can collaborate with you to get your seizures better controlled. If you have additional questions, please visit: [email or web address] for more information.    During this visit the following were done:  Labs Reviewed [x]    Labs Ordered []    Radiology Reports Reviewed []    Radiology Ordered []    PCP Records Reviewed [x]    Referring Provider Records Reviewed []    ER Records Reviewed []    Hospital Records Reviewed []    History Obtained From Family []    Radiology Images Reviewed []    Other Reviewed []    Records Requested []      01/13/25   11:24 EST    Patient or patient representative verbalized consent for the use of Ambient Listening during the visit with  JIMENEZ Morales for chart documentation. 1/13/2025  11:44 EST    Note to patient: The 21st Century Cures Act makes medical notes like these available to patients in the interest of transparency. However, be advised this is a medical document. It is intended as peer to peer communication. It is written in medical language and may contain abbreviations or verbiage that are unfamiliar. It may appear blunt or direct. Medical documents are intended to carry relevant information, facts as evident, and the clinical opinion of the provider.            "

## 2025-01-13 NOTE — PROGRESS NOTES
Subjective:     Patient ID: Val Bloom is a 44 y.o. female.    CC:   Chief Complaint   Patient presents with    Seizures       HPI:   History of Present Illness  This is a 44-year-old female who presents for a 1-year neurology follow-up on seizures and migraines. She was last seen on 01/09/2024. She has had two total seizure episodes, one in first grade and another in 2015. She is currently on levetiracetam  mg daily for seizure prevention. She is on sumatriptan as needed for migraines and folic acid 2 tablets daily. She is completing a 1-year follow-up today. We have discussed rescue seizure medication; however, she has preferred to wait on this prescription. Her cholesterol is being monitored at this time by PCP.    She reports no significant changes in her health status since her last visit. She has not required any surgical interventions or hospital admissions. She has been seizure-free since 2015. She has not experienced any migraines in 18 months since switching jobs. She continues to take folic acid daily and requires a refill of this medication. She prefers to remain on her Keppra long term.    FAMILY HISTORY  Her mother has epilepsy.    Prior Neurological History & Workup:  Seizure disorder and migraines. She had 2 lifetime seizure episodes, once in first grade and then once in 2015. She has been well controlled on levetiracetam  mg daily. She also takes folic acid 2 mg daily. She does eventually plan to have children.      She had an EEG in 2016, which was normal. Previous MRI of the brain in 2016 showed several small bifrontal gliotic foci felt to be related to migraines. Migraines have generally been just a few times per month. Her mother also has epilepsy and migraines. The seizure she had as a child was a generalized convulsive seizure. The second seizure she had was witnessed by her  and was in the night. She would not respond to him and was unable to speak and was staring.       Previously followed in clinic long term by Dr. Adrián Pringle, neurology.      Follows with GYN.     Episodic and rare migraines.     She has been having irregular menstrual cycles since 2020. She is still taking folic acid and medroxyprogesterone. She denies any pregnancy.      Migraines have responded well to sumatriptan in past.    The following portions of the patient's history were reviewed and updated as appropriate: allergies, current medications, past family history, past medical history, past social history, past surgical history, and problem list.    Past Medical History:   Diagnosis Date    Aspiration pneumonia 09/15/2016    Chronic left-sided low back pain with left-sided sciatica     Hypercholesteremia 11/4/2024    Migraine without aura     Seizures     first grade initial and then 2nd in 2015       Past Surgical History:   Procedure Laterality Date    NO PAST SURGERIES         Social History     Socioeconomic History    Marital status:     Number of children: 0    Highest education level: High school graduate   Tobacco Use    Smoking status: Never     Passive exposure: Never    Smokeless tobacco: Never   Vaping Use    Vaping status: Never Used   Substance and Sexual Activity    Alcohol use: Not Currently     Comment: Occasional    Drug use: No    Sexual activity: Not Currently     Partners: Male     Birth control/protection: None       Family History   Problem Relation Age of Onset    Alcohol abuse Mother     Alzheimer's disease Mother     Stroke Mother     Seizures Mother         Epilepsy    Hypertension Mother     Cancer Mother         History of malignant neoplasm    Migraines Mother     Breast cancer Paternal Aunt     Dementia Maternal Grandfather         My mom's Dad    Stroke Maternal Grandfather     Ovarian cancer Neg Hx     Uterine cancer Neg Hx     Colon cancer Neg Hx     Osteoporosis Neg Hx           Current Outpatient Medications:     folic acid (FOLVITE) 1 MG tablet, Take 2  "tablets by mouth Daily., Disp: 180 tablet, Rfl: 4    levETIRAcetam XR (KEPPRA XR) 500 MG tablet, Take 1 tablet by mouth Daily., Disp: 90 tablet, Rfl: 4    medroxyPROGESTERone (PROVERA) 10 MG tablet, Take 1 tablet by mouth Daily., Disp: 10 tablet, Rfl: 11    vitamin D (ERGOCALCIFEROL) 1.25 MG (89878 UT) capsule capsule, Take 1 capsule by mouth 1 (One) Time Per Week., Disp: 12 capsule, Rfl: 1     Review of Systems   Neurological:  Negative for seizures and headaches.   All other systems reviewed and are negative.       Objective:  /80   Pulse 96   Ht 157.5 cm (62\")   Wt 98.6 kg (217 lb 6.4 oz)   SpO2 96%   BMI 39.76 kg/m²     Neurological Exam  Mental Status  Alert. Oriented to person, place, time and situation. Speech is normal. Fund of knowledge is appropriate for level of education.    Cranial Nerves  CN II: Visual acuity is normal. Visual fields full to confrontation.  CN III, IV, VI: Extraocular movements intact bilaterally. Normal lids and orbits bilaterally. Pupils equal round and reactive to light bilaterally.  CN V: Facial sensation is normal.  CN VII: Full and symmetric facial movement.  CN IX, X: Palate elevates symmetrically. Normal gag reflex.  CN XI: Shoulder shrug strength is normal.  CN XII: Tongue midline without atrophy or fasciculations.    Motor  Normal muscle bulk throughout. Strength is 5/5 throughout all four extremities.    Reflexes  Deep tendon reflexes are 2+ and symmetric in all four extremities.    Coordination    Finger-to-nose, rapid alternating movements and heel-to-shin normal bilaterally without dysmetria.    Gait  Normal casual, toe, heel and tandem gait.      Physical Exam  Constitutional:       Appearance: Normal appearance.   Eyes:      General: Lids are normal.      Extraocular Movements: Extraocular movements intact.      Pupils: Pupils are equal, round, and reactive to light.   Neurological:      Mental Status: She is alert.      Motor: Motor strength is normal.     " Coordination: Coordination is intact.      Deep Tendon Reflexes: Reflexes are normal and symmetric.   Psychiatric:         Mood and Affect: Mood and affect normal.         Speech: Speech normal.         Results:  Results  Laboratory Studies  Labs with primary care were completed on 11/07/2024. Vitamin D level is low at 22. Vitamin B12 is 570, folate is greater than 20. TSH is 1.960, which is normal. Hemoglobin A1c is 5.6%. Lipid panel shows total cholesterol of 197, elevated triglycerides at 167, low HDL at 33 and LDL at 134. Comprehensive metabolic panel shows sodium at 135, CO2 at 21.6, otherwise normal. CBC without differential is essentially unremarkable.    Assessment/Plan:     Diagnoses and all orders for this visit:    1. Seizure disorder (Primary)  -     levETIRAcetam XR (KEPPRA XR) 500 MG tablet; Take 1 tablet by mouth Daily.  Dispense: 90 tablet; Refill: 4  -     folic acid (FOLVITE) 1 MG tablet; Take 2 tablets by mouth Daily.  Dispense: 180 tablet; Refill: 4    2. Migraine without aura and without status migrainosus, not intractable  Comments:  non in 18 months, no longer needs imitrex           Assessment & Plan  1. Epileptic seizures.  She has had two seizure episodes, one in first grade and another in 2015. She is currently on levetiracetam  mg daily for seizure prevention and prefers to continue this medication long-term. She has had no seizures since starting this regimen. A prescription refill for levetiracetam  mg daily has been provided to [facility] in [place]. Offered to complete EEG and potentially wean off Keppra XR, however, with her mom's history of Epilepsy, it was decided to continue her medications unchanged.    2. Migraines.  She is on sumatriptan as needed for migraines but has not experienced any migraines in the past 1.5 years. Sumatriptan has been discontinued due to the absence of migraines. If migraines recur, she can contact the office for a refill.    3. Vitamin D  "deficiency.  Her vitamin D levels remain low at 22 despite previous interventions. She is advised to try a vitamin D3 with K2 supplement, which may enhance absorption and improve her levels.    4. Medication management.  She is currently taking folic acid 2 tablets daily and requires a refill. The prescription has been sent to [facility] in [place].    Follow-up  The patient will follow up in 1 year or sooner if needed.    Reviewed medications, potential side effects and signs and symptoms to report. Discussed risk versus benefits of treatment plan with patient and/or family-including medications, labs and radiology that may be ordered. Addressed questions and concerns during visit. Patient and/or family verbalized understanding and agree with plan.    Patient instructions include: No driving or operating heavy machinery, solo bathing or tub baths for 90 days from onset of most recent seizure, if they currently hold a license. Minimize stress as much as possible. Recommended 7-8 hours of sleep each night. Abstain from alcohol intake. Educated on Antiepileptic medications with possible side effects and signs and symptoms to report if prescribed during visit. Instructed to take seizure medication daily if prescribed. Reviewed potential seizure risk factors. Instructed to call 911 or our office if another seizure does occur.    Sudden Unexpected Death in Epilepsy is defined as \"the sudden, unexpected, witnessed or unwitnessed, non-traumatic, and non-drowning death in patients with epilepsy with or without evidence for a seizure, and excluding documented status epilepticus, in which postmortem examination does not reveal a structural or toxicological cause for death.\"-Epilepsy Foundation 2021. SUDEP is most commonly seen in Epilepsy patient's who's seizures are not well controlled or who have seizures during their sleep. This condition is not fully understood. To reduce your risk of SUDEP, please take your seizure " medications as prescribed, keep a diary of your seizures and when they occur and if your seizures are not well controlled, please notify us so we can collaborate with you to get your seizures better controlled. If you have additional questions, please visit: [email or web address] for more information.    During this visit the following were done:  Labs Reviewed [x]    Labs Ordered []    Radiology Reports Reviewed []    Radiology Ordered []    PCP Records Reviewed [x]    Referring Provider Records Reviewed []    ER Records Reviewed []    Hospital Records Reviewed []    History Obtained From Family []    Radiology Images Reviewed []    Other Reviewed []    Records Requested []      01/13/25   11:24 EST    Patient or patient representative verbalized consent for the use of Ambient Listening during the visit with  JIMENEZ Morales for chart documentation. 1/13/2025  11:44 EST    Note to patient: The 21st Century Cures Act makes medical notes like these available to patients in the interest of transparency. However, be advised this is a medical document. It is intended as peer to peer communication. It is written in medical language and may contain abbreviations or verbiage that are unfamiliar. It may appear blunt or direct. Medical documents are intended to carry relevant information, facts as evident, and the clinical opinion of the provider.

## 2025-01-30 ENCOUNTER — TRANSCRIBE ORDERS (OUTPATIENT)
Dept: ADMINISTRATIVE | Facility: HOSPITAL | Age: 45
End: 2025-01-30
Payer: COMMERCIAL

## 2025-01-30 DIAGNOSIS — Z12.31 VISIT FOR SCREENING MAMMOGRAM: Primary | ICD-10-CM

## 2025-02-17 DIAGNOSIS — G40.909 SEIZURE DISORDER: ICD-10-CM

## 2025-02-17 RX ORDER — LEVETIRACETAM 500 MG/1
500 TABLET, FILM COATED, EXTENDED RELEASE ORAL DAILY
Qty: 90 TABLET | Refills: 4 | OUTPATIENT
Start: 2025-02-17

## 2025-02-17 NOTE — TELEPHONE ENCOUNTER
I CALLED PHARMACY ON HOLD FOR ALMOST 16 MINUTES OVER 3RD IN LINE THE WHOLE TIME AND EVENTUALLY DISCONNECTED NEVER SPEAKING WITH ANYONE.  I HAVE CALLED PT AND LEFT VM LETTING PT KNOW THE SCRIPT HAS BEEN SENT TO THIS PHARMACY BACK ON 1/13/2025 90 # WITH 4 REFILLS MEANING A OVER A YR PT SHOULD NOT NEED A REFILL AT THIS TIME.

## 2025-04-28 NOTE — PROGRESS NOTES
Subjective   Chief Complaint   Patient presents with    Annual Exam     Well woman exam     Val Bloom is a 44 y.o. year old  presenting to be seen for her annual exam. She has oligomenorrhea related to PCOS which is well controlled with provera for monthly menses induction.   Last pap 2024 with normal cytology. Neg hpv cotest.  She had birads 1 mammogram 2024. She has next exam scheduled for this summer.   SEXUAL Hx:  She is not currently sexually active.  In the past year there she has not been sexually active.    Condoms are not needed because she is not sexually active.  She would not like to be screened for STD's at today's exam.  Current birth control method: abstinence.  She is happy with her current method of contraception and does not want to discuss alternative methods of contraception.  MENSTRUAL Hx:  Patient's last menstrual period was 2025 (exact date).  In the past 6 months her cycles have been regular, predictable and occur monthly with use of provera, otherwise amenorrheic. Her menstrual flow is typically normal.   Each month on average there are roughly 0 day(s) of very heavy flow.    Intermenstrual bleeding is absent.    Post-coital bleeding is absent.  Dysmenorrhea: is not affecting her activities of daily living  PMS: is not affecting her activities of daily living  Her cycles are not a source of concern for her that she wishes to discuss today.  HEALTH Hx:  She exercises regularly: no (but is planning to start exercising more). Active in her job but not specific   She wears her seat belt: yes.  She has concerns about domestic violence: no.  OTHER THINGS SHE WANTS TO DISCUSS TODAY:  Nothing else    The following portions of the patient's history were reviewed and updated as appropriate:problem list, current medications, allergies, past family history, past medical history, past social history, and past surgical history.    Social History    Tobacco Use      Smoking status:  "Never        Passive exposure: Never      Smokeless tobacco: Never    Review of Systems  Constitutional POS: nothing reported    NEG: anorexia or night sweats   Genitourinary POS: nothing reported    NEG: dysuria or hematuria      Gastointestinal POS: nothing reported    NEG: bloating, change in bowel habits, melena, or reflux symptoms   Integument POS: nothing reported    NEG: moles that are changing in size, shape, color or rashes   Breast POS: nothing reported    NEG: persistent breast lump, skin dimpling, or nipple discharge        Objective   /80 (BP Location: Left arm, Patient Position: Sitting, Cuff Size: Adult)   Ht 157.5 cm (62\")   Wt 95.9 kg (211 lb 6.4 oz)   LMP 03/12/2025 (Exact Date)   BMI 38.67 kg/m²     General:  well developed; well nourished  no acute distress  mentation appropriate  obese - Body mass index is 38.67 kg/m².   Skin:  No suspicious lesions seen   Thyroid: normal to inspection and palpation   Breasts:  Examined in supine position  Symmetric without masses or skin dimpling  Nipples normal without inversion, lesions or discharge  There are no palpable axillary nodes   Abdomen: soft, non-tender; no masses  no umbilical or inguinal hernias are present  no hepato-splenomegaly   Pelvis: Clinical staff was present for exam  :  urethral meatus normal; urethra normal:  Vaginal:  normal pink mucosa without prolapse or lesions.  Cervix:  normal appearance.  Uterus:  normal size, shape and consistency.  Adnexa:  non palpable bilaterally  Rectal:  digital rectal exam not performed; anus visually normal appearing.  Exam limited by patient discomfort with exam         Assessment   Well woman with routine gynecological exam   Oligomenorrhea- uses progesterone for menses induction.      Plan     Pap was not done today.  I explained to Val that the recommendations for Pap smear interval in a low risk patient has lengthened to 3 years time.  I told Val she still needs to be seen in our " office yearly for a full physical including breast and pelvic exam.  She was encouraged to get yearly mammograms.  She should report any palpable breast lump(s) or skin changes regardless of mammographic findings.  I explained to Val that notification regarding her mammogram results will come from the center performing the study.  Our office will not be routinely calling with mammogram results.  It is her responsibility to make sure that the results from the mammogram are communicated to her by the breast center.  If she has any questions about the results, she is welcome to call our office anytime.  Prescription(s) for provera were refilled today   The importance of keeping all planned follow-up and taking all medications as prescribed was emphasized.  Today I discussed with Val the total recommended calcium intake for a premenopausal female is 1000 mg.  Ideally this should be from dietary sources.  I reviewed calcium content in various foods including milk, fortified orange juice and yogurt.  If she cannot get sufficient calcium through dietary means, it is recommended to supplement with either a multivitamin or calcium to reach her daily goal.  I also reviewed the difference in the bioavailability of calcium carbonate and calcium citrate containing supplements and the importance of taking calcium carbonate containing products with food.  Finally, vitamin D's role in calcium absorption was reviewed and a total daily vitamin D intake of 800 units was recommended.  Follow up for annual exam 1 year    No orders of the defined types were placed in this encounter.         This note was electronically signed.    JIMENEZ Coulter  April 30, 2025

## 2025-04-30 ENCOUNTER — OFFICE VISIT (OUTPATIENT)
Dept: OBSTETRICS AND GYNECOLOGY | Facility: CLINIC | Age: 45
End: 2025-04-30
Payer: COMMERCIAL

## 2025-04-30 VITALS
WEIGHT: 211.4 LBS | HEIGHT: 62 IN | SYSTOLIC BLOOD PRESSURE: 120 MMHG | DIASTOLIC BLOOD PRESSURE: 80 MMHG | BODY MASS INDEX: 38.9 KG/M2

## 2025-04-30 DIAGNOSIS — N91.3 PRIMARY OLIGOMENORRHEA: ICD-10-CM

## 2025-04-30 DIAGNOSIS — E28.2 PCOS (POLYCYSTIC OVARIAN SYNDROME): ICD-10-CM

## 2025-04-30 DIAGNOSIS — Z01.419 WELL WOMAN EXAM WITH ROUTINE GYNECOLOGICAL EXAM: Primary | ICD-10-CM

## 2025-04-30 RX ORDER — MEDROXYPROGESTERONE ACETATE 10 MG
10 TABLET ORAL DAILY
Qty: 30 TABLET | Refills: 4 | Status: SHIPPED | OUTPATIENT
Start: 2025-04-30

## 2025-06-03 ENCOUNTER — OFFICE VISIT (OUTPATIENT)
Dept: INTERNAL MEDICINE | Age: 45
End: 2025-06-03
Payer: COMMERCIAL

## 2025-06-03 VITALS
DIASTOLIC BLOOD PRESSURE: 82 MMHG | HEART RATE: 88 BPM | TEMPERATURE: 97 F | BODY MASS INDEX: 38.53 KG/M2 | HEIGHT: 62 IN | WEIGHT: 209.4 LBS | OXYGEN SATURATION: 97 % | SYSTOLIC BLOOD PRESSURE: 114 MMHG

## 2025-06-03 DIAGNOSIS — M54.6 ACUTE BILATERAL THORACIC BACK PAIN: ICD-10-CM

## 2025-06-03 DIAGNOSIS — E78.00 HYPERCHOLESTEREMIA: ICD-10-CM

## 2025-06-03 DIAGNOSIS — M54.2 CERVICAL PAIN: Primary | ICD-10-CM

## 2025-06-03 DIAGNOSIS — E55.9 VITAMIN D DEFICIENCY: ICD-10-CM

## 2025-06-03 PROCEDURE — 99214 OFFICE O/P EST MOD 30 MIN: CPT | Performed by: NURSE PRACTITIONER

## 2025-06-03 RX ORDER — DICLOFENAC SODIUM 75 MG/1
75 TABLET, DELAYED RELEASE ORAL 2 TIMES DAILY PRN
Qty: 60 TABLET | Refills: 1 | Status: SHIPPED | OUTPATIENT
Start: 2025-06-03

## 2025-06-03 RX ORDER — ERGOCALCIFEROL 1.25 MG/1
50000 CAPSULE, LIQUID FILLED ORAL WEEKLY
Qty: 12 CAPSULE | Refills: 3 | Status: SHIPPED | OUTPATIENT
Start: 2025-06-03

## 2025-06-03 RX ORDER — PREDNISONE 10 MG/1
TABLET ORAL
Qty: 21 TABLET | Refills: 0 | Status: SHIPPED | OUTPATIENT
Start: 2025-06-03

## 2025-06-03 RX ORDER — CYCLOBENZAPRINE HCL 10 MG
10 TABLET ORAL 2 TIMES DAILY PRN
Qty: 60 TABLET | Refills: 1 | Status: SHIPPED | OUTPATIENT
Start: 2025-06-03

## 2025-06-03 NOTE — PROGRESS NOTES
Subjective   Chief Complaint   Patient presents with    Neck Pain     Burning sensation and sharp pain. Started on May 17th.         Val Bloom is a 45 y.o. female.    History of Present Illness  The patient presents for evaluation of neck pain.    She reports experiencing radiating neck pain, which was particularly severe from 05/17/2025 to 05/23/2025, but has since subsided. The pain, described as a sharp, hot, stabbing sensation, extends from the top of her neck to her shoulder blades. She likens the sensation to being pricked with pins along her back. The onset of the pain was sudden, occurring at night without any preceding injury. She also experienced a burning sensation in her neck, which made it difficult to find a comfortable sleeping position. Her mobility was slightly compromised due to the pain. The pain was so intense that it would wake her up every 10 minutes, forcing her to get out of bed and walk around before attempting to sleep again. The pain was not localized and affected her entire neck, regardless of her sleeping position.     She has previously taken a steroid pack in 04/2025 for bronchitis, which she tolerated well.    She is currently not taking vitamin D supplements as her supply has been exhausted and she has not refilled it.    FAMILY HISTORY  Her father has had a pinched nerve all his life.    I have reviewed the following portions of the patient's history and confirmed they are accurate: allergies, current medications, past family history, past medical history, past social history, past surgical history, and problem list    Review of Systems  Pertinent items are noted in HPI.     Current Outpatient Medications on File Prior to Visit   Medication Sig    folic acid (FOLVITE) 1 MG tablet Take 2 tablets by mouth Daily.    levETIRAcetam XR (KEPPRA XR) 500 MG tablet Take 1 tablet by mouth Daily.    medroxyPROGESTERone (PROVERA) 10 MG tablet Take 1 tablet by mouth Daily.     No current  "facility-administered medications on file prior to visit.       Objective   Vitals:    06/03/25 0807   BP: 114/82   BP Location: Left arm   Patient Position: Sitting   Cuff Size: Large Adult   Pulse: 88   Temp: 97 °F (36.1 °C)   SpO2: 97%   Weight: 95 kg (209 lb 6.4 oz)   Height: 157.5 cm (62.01\")     Body mass index is 38.29 kg/m².    Physical Exam  Vitals reviewed.   Constitutional:       Appearance: Normal appearance. She is well-developed.   HENT:      Head: Normocephalic and atraumatic.      Nose: Nose normal.   Eyes:      General: Lids are normal.      Conjunctiva/sclera: Conjunctivae normal.      Pupils: Pupils are equal, round, and reactive to light.   Neck:      Thyroid: No thyromegaly.      Trachea: Trachea normal.   Pulmonary:      Effort: Pulmonary effort is normal. No respiratory distress.   Musculoskeletal:      Cervical back: Tenderness present. Pain with movement present. Decreased range of motion.      Thoracic back: Tenderness present.   Skin:     General: Skin is warm and dry.   Neurological:      Mental Status: She is alert and oriented to person, place, and time.      GCS: GCS eye subscore is 4. GCS verbal subscore is 5. GCS motor subscore is 6.   Psychiatric:         Attention and Perception: Attention normal.         Mood and Affect: Mood normal.         Speech: Speech normal.         Behavior: Behavior normal. Behavior is cooperative.         Results  Labs   - Vitamin D: 11/2024, Low   - LDL: 11/2024, Elevated    Lab Results   Component Value Date    WBC 6.64 11/07/2024    HGB 15.4 11/07/2024    HCT 45.0 11/07/2024    MCV 91.3 11/07/2024     11/07/2024      Lab Results   Component Value Date    GLUCOSE 93 11/07/2024    BUN 16 11/07/2024    CREATININE 0.99 11/07/2024     (L) 11/07/2024    K 4.4 11/07/2024     11/07/2024    CALCIUM 9.1 11/07/2024    PROTEINTOT 7.4 11/07/2024    ALBUMIN 3.9 11/07/2024    ALT 22 11/07/2024    AST 19 11/07/2024    ALKPHOS 63 11/07/2024    " BILITOT 0.6 11/07/2024    GLOB 3.5 11/07/2024    AGRATIO 1.1 11/07/2024    BCR 16.2 11/07/2024    ANIONGAP 11.4 11/07/2024    EGFR 73.2 11/07/2024      Lab Results   Component Value Date    HGBA1C 5.60 11/07/2024      Lab Results   Component Value Date    CHOL 197 11/07/2024    TRIG 167 (H) 11/07/2024    HDL 33 (L) 11/07/2024     (H) 11/07/2024      Lab Results   Component Value Date    TSH 1.960 11/07/2024          Assessment & Plan   Problem List Items Addressed This Visit       Hypercholesteremia     Other Visit Diagnoses         Cervical pain    -  Primary    Relevant Medications    diclofenac (VOLTAREN) 75 MG EC tablet    cyclobenzaprine (FLEXERIL) 10 MG tablet    predniSONE (DELTASONE) 10 MG (21) dose pack    Diclofenac Sodium (VOLTAREN) 1 % gel gel    Other Relevant Orders    XR Spine Cervical Complete 4 or 5 View      Acute bilateral thoracic back pain        Relevant Medications    diclofenac (VOLTAREN) 75 MG EC tablet    cyclobenzaprine (FLEXERIL) 10 MG tablet    predniSONE (DELTASONE) 10 MG (21) dose pack    Diclofenac Sodium (VOLTAREN) 1 % gel gel    Other Relevant Orders    XR Spine Thoracic 3 View      Vitamin D deficiency                   Current Outpatient Medications:     folic acid (FOLVITE) 1 MG tablet, Take 2 tablets by mouth Daily., Disp: 180 tablet, Rfl: 4    levETIRAcetam XR (KEPPRA XR) 500 MG tablet, Take 1 tablet by mouth Daily., Disp: 90 tablet, Rfl: 4    medroxyPROGESTERone (PROVERA) 10 MG tablet, Take 1 tablet by mouth Daily., Disp: 30 tablet, Rfl: 4    vitamin D (ERGOCALCIFEROL) 1.25 MG (16798 UT) capsule capsule, Take 1 capsule by mouth 1 (One) Time Per Week., Disp: 12 capsule, Rfl: 3    cyclobenzaprine (FLEXERIL) 10 MG tablet, Take 1 tablet by mouth 2 (Two) Times a Day As Needed for Muscle Spasms., Disp: 60 tablet, Rfl: 1    diclofenac (VOLTAREN) 75 MG EC tablet, Take 1 tablet by mouth 2 (Two) Times a Day As Needed (neck pain). Take with food., Disp: 60 tablet, Rfl: 1     Diclofenac Sodium (VOLTAREN) 1 % gel gel, Apply 1 gram topically to indicated area 4 times daily as needed for mild to moderate pain., Disp: 100 g, Rfl: 2    predniSONE (DELTASONE) 10 MG (21) dose pack, Use as directed on package, Disp: 21 tablet, Rfl: 0    Assessment & Plan  1. Cervicalgia.  - Symptoms suggest a possible nerve impingement or muscle damage, causing radiating pain from the neck to the shoulder blades.  - Physical examination revealed difficulty bending forward, indicating potential muscular involvement or impingement.  - A comprehensive treatment plan has been discussed, including a steroid pack, an anti-inflammatory medication, and a muscle relaxant. Additionally, diclofenac gel will be provided for topical application on the back or neck.  - An x-ray of the neck will be ordered to further investigate the cause of the pain. If the current treatment plan proves ineffective, stronger medications may be considered. If the pain persists, physical therapy will be recommended to perform stretching and maneuvers to relieve nerve impingement.    2. Vitamin D deficiency.  - Last set of labs showed low vitamin D levels.  - She is no longer taking vitamin D supplement.  - She will restart the vitamin D supplement and repeat labs in November.  - She is following a low cholesterol diet and will repeat fasting lipid panel in November.    Follow-up  - The patient will follow up in November for a physical exam and blood work.         Plan of care reviewed with the patient at the conclusion of today's visit.  Education was provided regarding diagnosis, management, and any prescribed or recommended OTC medications.  Patient verbalized understanding of and agreement with management plan.     Return in about 6 months (around 12/3/2025), or if symptoms worsen or fail to improve, for Follow-up.      Transcribed from ambient dictation for JIMENEZ Rios by JIMENEZ Rios.  06/03/25   08:34  EDT    Patient or patient representative verbalized consent for the use of Ambient Listening during the visit with  JIMENEZ Rios for chart documentation. 6/12/2025  08:35 EDT

## 2025-06-12 ENCOUNTER — HOSPITAL ENCOUNTER (OUTPATIENT)
Dept: MAMMOGRAPHY | Facility: HOSPITAL | Age: 45
Discharge: HOME | End: 2025-06-12
Admitting: NURSE PRACTITIONER
Payer: COMMERCIAL

## 2025-06-12 DIAGNOSIS — Z12.31 VISIT FOR SCREENING MAMMOGRAM: ICD-10-CM

## 2025-06-12 PROCEDURE — 77063 BREAST TOMOSYNTHESIS BI: CPT

## 2025-06-12 PROCEDURE — 77067 SCR MAMMO BI INCL CAD: CPT

## 2025-07-06 DIAGNOSIS — M54.6 ACUTE BILATERAL THORACIC BACK PAIN: ICD-10-CM

## 2025-07-06 DIAGNOSIS — M54.2 CERVICAL PAIN: ICD-10-CM

## 2025-07-07 RX ORDER — DICLOFENAC SODIUM 75 MG/1
TABLET, DELAYED RELEASE ORAL
Qty: 60 TABLET | Refills: 3 | Status: SHIPPED | OUTPATIENT
Start: 2025-07-07

## 2025-07-11 ENCOUNTER — HOSPITAL ENCOUNTER (OUTPATIENT)
Dept: MAMMOGRAPHY | Facility: HOSPITAL | Age: 45
Discharge: HOME | End: 2025-07-11
Payer: COMMERCIAL

## 2025-07-11 DIAGNOSIS — Z12.31 VISIT FOR SCREENING MAMMOGRAM: ICD-10-CM
